# Patient Record
Sex: MALE | Race: ASIAN | NOT HISPANIC OR LATINO | Employment: FULL TIME | URBAN - METROPOLITAN AREA
[De-identification: names, ages, dates, MRNs, and addresses within clinical notes are randomized per-mention and may not be internally consistent; named-entity substitution may affect disease eponyms.]

---
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
## 2019-10-09 ENCOUNTER — OFFICE VISIT - HEALTHEAST (OUTPATIENT)
Dept: ADDICTION MEDICINE | Facility: CLINIC | Age: 19
End: 2019-10-09

## 2019-10-09 DIAGNOSIS — Z03.89 NO DIAGNOSIS ON AXIS I: ICD-10-CM

## 2019-10-18 ENCOUNTER — COMMUNICATION - HEALTHEAST (OUTPATIENT)
Dept: ADDICTION MEDICINE | Facility: CLINIC | Age: 19
End: 2019-10-18

## 2019-11-27 ENCOUNTER — OFFICE VISIT - HEALTHEAST (OUTPATIENT)
Dept: FAMILY MEDICINE | Facility: CLINIC | Age: 19
End: 2019-11-27

## 2019-11-27 DIAGNOSIS — Z00.00 ROUTINE GENERAL MEDICAL EXAMINATION AT A HEALTH CARE FACILITY: ICD-10-CM

## 2019-11-27 DIAGNOSIS — F17.210 CIGARETTE NICOTINE DEPENDENCE WITHOUT COMPLICATION: ICD-10-CM

## 2019-11-27 DIAGNOSIS — R00.2 PALPITATIONS: ICD-10-CM

## 2019-11-27 LAB
ANION GAP SERPL CALCULATED.3IONS-SCNC: 12 MMOL/L (ref 5–18)
BUN SERPL-MCNC: 12 MG/DL (ref 8–22)
CALCIUM SERPL-MCNC: 10.2 MG/DL (ref 8.5–10.5)
CHLORIDE BLD-SCNC: 105 MMOL/L (ref 98–107)
CO2 SERPL-SCNC: 23 MMOL/L (ref 22–31)
CREAT SERPL-MCNC: 0.92 MG/DL (ref 0.7–1.3)
ERYTHROCYTE [DISTWIDTH] IN BLOOD BY AUTOMATED COUNT: 13 % (ref 11–14.5)
GFR SERPL CREATININE-BSD FRML MDRD: >60 ML/MIN/1.73M2
GLUCOSE BLD-MCNC: 80 MG/DL (ref 70–125)
HBA1C MFR BLD: 5.9 % (ref 3.5–6)
HCT VFR BLD AUTO: 46.2 % (ref 40–54)
HGB BLD-MCNC: 15.4 G/DL (ref 14–18)
LDLC SERPL CALC-MCNC: 204 MG/DL
MCH RBC QN AUTO: 28.4 PG (ref 27–34)
MCHC RBC AUTO-ENTMCNC: 33.3 G/DL (ref 32–36)
MCV RBC AUTO: 85 FL (ref 80–100)
PLATELET # BLD AUTO: 422 THOU/UL (ref 140–440)
PMV BLD AUTO: 7.6 FL (ref 7–10)
POTASSIUM BLD-SCNC: 4 MMOL/L (ref 3.5–5)
RBC # BLD AUTO: 5.41 MILL/UL (ref 4.4–6.2)
SODIUM SERPL-SCNC: 140 MMOL/L (ref 136–145)
TSH SERPL DL<=0.005 MIU/L-ACNC: 2.93 UIU/ML (ref 0.3–5)
WBC: 13.1 THOU/UL (ref 4–11)

## 2019-11-27 ASSESSMENT — MIFFLIN-ST. JEOR: SCORE: 1688.81

## 2019-11-27 ASSESSMENT — PATIENT HEALTH QUESTIONNAIRE - PHQ9: SUM OF ALL RESPONSES TO PHQ QUESTIONS 1-9: 2

## 2019-12-02 ENCOUNTER — COMMUNICATION - HEALTHEAST (OUTPATIENT)
Dept: FAMILY MEDICINE | Facility: CLINIC | Age: 19
End: 2019-12-02

## 2021-04-22 ENCOUNTER — OFFICE VISIT - HEALTHEAST (OUTPATIENT)
Dept: FAMILY MEDICINE | Facility: CLINIC | Age: 21
End: 2021-04-22

## 2021-04-22 DIAGNOSIS — Z23 NEED FOR IMMUNIZATION AGAINST INFLUENZA: ICD-10-CM

## 2021-04-22 DIAGNOSIS — R06.02 SHORTNESS OF BREATH: ICD-10-CM

## 2021-04-22 DIAGNOSIS — R10.13 ABDOMINAL PAIN, EPIGASTRIC: ICD-10-CM

## 2021-04-22 DIAGNOSIS — Z00.00 ROUTINE GENERAL MEDICAL EXAMINATION AT A HEALTH CARE FACILITY: ICD-10-CM

## 2021-04-22 LAB
ALBUMIN SERPL-MCNC: 4.7 G/DL (ref 3.5–5)
ALP SERPL-CCNC: 75 U/L (ref 45–120)
ALT SERPL W P-5'-P-CCNC: 41 U/L (ref 0–45)
ANION GAP SERPL CALCULATED.3IONS-SCNC: 13 MMOL/L (ref 5–18)
AST SERPL W P-5'-P-CCNC: 33 U/L (ref 0–40)
BILIRUB SERPL-MCNC: 0.6 MG/DL (ref 0–1)
BUN SERPL-MCNC: 11 MG/DL (ref 8–22)
CALCIUM SERPL-MCNC: 10 MG/DL (ref 8.5–10.5)
CHLORIDE BLD-SCNC: 105 MMOL/L (ref 98–107)
CO2 SERPL-SCNC: 21 MMOL/L (ref 22–31)
CREAT SERPL-MCNC: 0.89 MG/DL (ref 0.7–1.3)
GFR SERPL CREATININE-BSD FRML MDRD: >60 ML/MIN/1.73M2
GLUCOSE BLD-MCNC: 114 MG/DL (ref 70–125)
POTASSIUM BLD-SCNC: 4.2 MMOL/L (ref 3.5–5)
PROT SERPL-MCNC: 7.9 G/DL (ref 6–8)
SODIUM SERPL-SCNC: 139 MMOL/L (ref 136–145)

## 2021-04-22 ASSESSMENT — MIFFLIN-ST. JEOR: SCORE: 1699.24

## 2021-04-26 ENCOUNTER — AMBULATORY - HEALTHEAST (OUTPATIENT)
Dept: LAB | Facility: CLINIC | Age: 21
End: 2021-04-26

## 2021-04-26 DIAGNOSIS — R10.13 ABDOMINAL PAIN, EPIGASTRIC: ICD-10-CM

## 2021-04-28 LAB — H PYLORI AG STL QL IA: NEGATIVE

## 2021-04-29 ENCOUNTER — COMMUNICATION - HEALTHEAST (OUTPATIENT)
Dept: FAMILY MEDICINE | Facility: CLINIC | Age: 21
End: 2021-04-29

## 2021-05-06 ENCOUNTER — AMBULATORY - HEALTHEAST (OUTPATIENT)
Dept: NURSING | Facility: CLINIC | Age: 21
End: 2021-05-06

## 2021-05-10 ENCOUNTER — OFFICE VISIT - HEALTHEAST (OUTPATIENT)
Dept: FAMILY MEDICINE | Facility: CLINIC | Age: 21
End: 2021-05-10

## 2021-05-10 ENCOUNTER — COMMUNICATION - HEALTHEAST (OUTPATIENT)
Dept: SCHEDULING | Facility: CLINIC | Age: 21
End: 2021-05-10

## 2021-05-10 DIAGNOSIS — R42 ORTHOSTATIC DIZZINESS: ICD-10-CM

## 2021-05-10 DIAGNOSIS — R10.13 ABDOMINAL PAIN, EPIGASTRIC: ICD-10-CM

## 2021-05-10 DIAGNOSIS — R00.2 PALPITATIONS: ICD-10-CM

## 2021-05-10 DIAGNOSIS — R06.02 SHORTNESS OF BREATH: ICD-10-CM

## 2021-05-10 ASSESSMENT — MIFFLIN-ST. JEOR: SCORE: 1673.28

## 2021-05-13 ENCOUNTER — OFFICE VISIT - HEALTHEAST (OUTPATIENT)
Dept: FAMILY MEDICINE | Facility: CLINIC | Age: 21
End: 2021-05-13

## 2021-05-13 DIAGNOSIS — K29.20 ACUTE ALCOHOLIC GASTRITIS WITHOUT HEMORRHAGE: ICD-10-CM

## 2021-05-13 DIAGNOSIS — K21.00 GASTROESOPHAGEAL REFLUX DISEASE WITH ESOPHAGITIS WITHOUT HEMORRHAGE: ICD-10-CM

## 2021-05-13 ASSESSMENT — MIFFLIN-ST. JEOR: SCORE: 1674.08

## 2021-05-19 ENCOUNTER — HOSPITAL ENCOUNTER (OUTPATIENT)
Dept: CARDIOLOGY | Facility: HOSPITAL | Age: 21
Discharge: HOME | End: 2021-05-19
Attending: FAMILY MEDICINE

## 2021-05-19 DIAGNOSIS — R00.2 PALPITATIONS: ICD-10-CM

## 2021-05-19 DIAGNOSIS — R42 ORTHOSTATIC DIZZINESS: ICD-10-CM

## 2021-05-22 ENCOUNTER — OFFICE VISIT - HEALTHEAST (OUTPATIENT)
Dept: FAMILY MEDICINE | Facility: CLINIC | Age: 21
End: 2021-05-22

## 2021-05-22 DIAGNOSIS — R06.02 SOB (SHORTNESS OF BREATH): ICD-10-CM

## 2021-05-22 RX ORDER — ALBUTEROL SULFATE 90 UG/1
2 AEROSOL, METERED RESPIRATORY (INHALATION) EVERY 6 HOURS PRN
Qty: 1 EACH | Refills: 0 | Status: SHIPPED | OUTPATIENT
Start: 2021-05-22

## 2021-05-24 ENCOUNTER — AMBULATORY - HEALTHEAST (OUTPATIENT)
Dept: FAMILY MEDICINE | Facility: CLINIC | Age: 21
End: 2021-05-24

## 2021-05-24 ENCOUNTER — COMMUNICATION - HEALTHEAST (OUTPATIENT)
Dept: SCHEDULING | Facility: CLINIC | Age: 21
End: 2021-05-24

## 2021-05-24 ENCOUNTER — OFFICE VISIT - HEALTHEAST (OUTPATIENT)
Dept: FAMILY MEDICINE | Facility: CLINIC | Age: 21
End: 2021-05-24

## 2021-05-24 DIAGNOSIS — R94.31 ABNORMAL HOLTER EXAM: ICD-10-CM

## 2021-05-24 DIAGNOSIS — R06.02 SHORTNESS OF BREATH: ICD-10-CM

## 2021-05-24 DIAGNOSIS — R42 ORTHOSTATIC DIZZINESS: ICD-10-CM

## 2021-05-24 DIAGNOSIS — R00.2 PALPITATIONS: ICD-10-CM

## 2021-05-24 DIAGNOSIS — K21.00 GASTROESOPHAGEAL REFLUX DISEASE WITH ESOPHAGITIS WITHOUT HEMORRHAGE: ICD-10-CM

## 2021-05-24 DIAGNOSIS — F41.9 ANXIETY: ICD-10-CM

## 2021-05-24 LAB — TSH SERPL DL<=0.005 MIU/L-ACNC: 1.78 UIU/ML (ref 0.3–5)

## 2021-05-24 RX ORDER — FAMOTIDINE 20 MG/1
20 TABLET, FILM COATED ORAL 2 TIMES DAILY
Status: SHIPPED | COMMUNITY
Start: 2021-05-24

## 2021-05-25 ENCOUNTER — COMMUNICATION - HEALTHEAST (OUTPATIENT)
Dept: SCHEDULING | Facility: CLINIC | Age: 21
End: 2021-05-25

## 2021-05-26 ENCOUNTER — AMBULATORY - HEALTHEAST (OUTPATIENT)
Dept: CARE COORDINATION | Facility: CLINIC | Age: 21
End: 2021-05-26

## 2021-05-26 ENCOUNTER — COMMUNICATION - HEALTHEAST (OUTPATIENT)
Dept: NURSING | Facility: CLINIC | Age: 21
End: 2021-05-26

## 2021-05-26 DIAGNOSIS — Z71.89 OTHER SPECIFIED COUNSELING: Chronic | ICD-10-CM

## 2021-05-26 ASSESSMENT — PATIENT HEALTH QUESTIONNAIRE - PHQ9: SUM OF ALL RESPONSES TO PHQ QUESTIONS 1-9: 2

## 2021-05-27 ENCOUNTER — OFFICE VISIT - HEALTHEAST (OUTPATIENT)
Dept: FAMILY MEDICINE | Facility: CLINIC | Age: 21
End: 2021-05-27

## 2021-05-27 VITALS
SYSTOLIC BLOOD PRESSURE: 124 MMHG | OXYGEN SATURATION: 97 % | HEART RATE: 74 BPM | WEIGHT: 165.2 LBS | DIASTOLIC BLOOD PRESSURE: 80 MMHG | RESPIRATION RATE: 20 BRPM | HEIGHT: 65 IN | BODY MASS INDEX: 27.52 KG/M2 | TEMPERATURE: 98.4 F

## 2021-05-27 VITALS
RESPIRATION RATE: 18 BRPM | WEIGHT: 165.38 LBS | HEIGHT: 65 IN | BODY MASS INDEX: 27.56 KG/M2 | DIASTOLIC BLOOD PRESSURE: 74 MMHG | TEMPERATURE: 98.2 F | SYSTOLIC BLOOD PRESSURE: 128 MMHG | HEART RATE: 80 BPM

## 2021-05-27 DIAGNOSIS — K29.20 ACUTE ALCOHOLIC GASTRITIS WITHOUT HEMORRHAGE: ICD-10-CM

## 2021-05-27 DIAGNOSIS — M94.0 COSTOCHONDRITIS: ICD-10-CM

## 2021-05-27 DIAGNOSIS — K21.00 GASTROESOPHAGEAL REFLUX DISEASE WITH ESOPHAGITIS WITHOUT HEMORRHAGE: ICD-10-CM

## 2021-05-27 DIAGNOSIS — R07.89 CHEST WALL PAIN: ICD-10-CM

## 2021-05-27 LAB
CHOLEST SERPL-MCNC: 301 MG/DL
FASTING STATUS PATIENT QL REPORTED: NO
HDLC SERPL-MCNC: 43 MG/DL
LDLC SERPL CALC-MCNC: 216 MG/DL
TRIGL SERPL-MCNC: 211 MG/DL

## 2021-05-27 RX ORDER — SUCRALFATE 1 G/1
1 TABLET ORAL 4 TIMES DAILY
Qty: 120 TABLET | Refills: 1 | Status: SHIPPED | OUTPATIENT
Start: 2021-05-27

## 2021-05-27 ASSESSMENT — MIFFLIN-ST. JEOR: SCORE: 1646.58

## 2021-05-28 ENCOUNTER — COMMUNICATION - HEALTHEAST (OUTPATIENT)
Dept: FAMILY MEDICINE | Facility: CLINIC | Age: 21
End: 2021-05-28

## 2021-05-28 ENCOUNTER — AMBULATORY - HEALTHEAST (OUTPATIENT)
Dept: FAMILY MEDICINE | Facility: CLINIC | Age: 21
End: 2021-05-28

## 2021-06-02 VITALS — BODY MASS INDEX: 24.99 KG/M2 | HEIGHT: 65 IN | WEIGHT: 150 LBS

## 2021-06-03 VITALS
SYSTOLIC BLOOD PRESSURE: 128 MMHG | HEART RATE: 94 BPM | DIASTOLIC BLOOD PRESSURE: 70 MMHG | RESPIRATION RATE: 16 BRPM | WEIGHT: 166 LBS | TEMPERATURE: 98.9 F | BODY MASS INDEX: 27.66 KG/M2 | HEIGHT: 65 IN | OXYGEN SATURATION: 97 %

## 2021-06-03 NOTE — TELEPHONE ENCOUNTER
----- Message from Stiven Pelaez MD sent at 12/2/2019 11:38 AM CST -----  Please inform the patient that his test results are all normal except for a significant elevation in his cholesterol.  He should change his eating to incorporate more fruits and vegetables and whole grains in his daily diet, daily exercise, less meats and eggs and other fatty foods, and recheck the cholesterol again in 6 to 9 months.

## 2021-06-03 NOTE — PROGRESS NOTES
Physical Exam       ASSESMENT AND PLAN:    Physical, Health Maitenence -   Reviewed healthy lifestyle, diet, exercise, vitamins, and follow-up plan today with patient.  Reviewed age appropriate cancer and other screening recommendations.  Immunization review and update done.  Reviewed indicated lab tests, see lab orders.     -     Hearing Screening  -     Vision Screening  -     LDL Cholesterol, Direct  -     Influenza,Seasonal,Quad,INJ =/>6months    Palpitations  Reviewed differential diagnosis with the patient, discussed indications for follow-up.  Will call patient with lab results when they are available.  -     HM2(CBC w/o Differential)  -     Thyroid Cascade  -     Basic Metabolic Panel  -     Glycosylated Hemoglobin A1c    Cigarette nicotine dependence without complication  Counseling done on a quit plan.  Will use nicotine patch as prescribed below.  -     nicotine (NICODERM CQ) 14 mg/24 hr; Place 1 patch on the skin daily.  Dispense: 30 patch; Refill: 6          HPI: 18-year-old male here for his physical.  He would like to quit smoking.  Currently smoking about 10 cigarettes/day.  Patient has a remote history of meth abuse but has not used any meth in the past couple of years.  Patient reports that he gets some occasional palpitations.  Symptoms last for several minutes, associated with some feelings of anxiety, it has been going on on and off occasionally over the past 3 months.  No chest pain, no shortness of breath.  No near syncope.    ROS: No blood in the urine or blood in the stool, no chest pain, no shortness of breath, no fever, remainder of review of systems is as above or negative.    No past medical history on file.    Current Outpatient Medications   Medication Sig Dispense Refill     nicotine (NICODERM CQ) 14 mg/24 hr Place 1 patch on the skin daily. 30 patch 6     No current facility-administered medications for this visit.        Patient Active Problem List   Diagnosis     Decrease In  "Appetite     Contusion Of Lung     Open Wound Of The Scalp     Acute Sore Throat     Acute Lymphadenitis     Cigarette nicotine dependence without complication       Social History     Socioeconomic History     Marital status: Single     Spouse name: None     Number of children: None     Years of education: None     Highest education level: None   Occupational History     None   Social Needs     Financial resource strain: None     Food insecurity:     Worry: None     Inability: None     Transportation needs:     Medical: None     Non-medical: None   Tobacco Use     Smoking status: Current Some Day Smoker     Types: Cigarettes     Smokeless tobacco: Never Used   Substance and Sexual Activity     Alcohol use: No     Drug use: None     Sexual activity: None   Lifestyle     Physical activity:     Days per week: None     Minutes per session: None     Stress: None   Relationships     Social connections:     Talks on phone: None     Gets together: None     Attends Yazdanism service: None     Active member of club or organization: None     Attends meetings of clubs or organizations: None     Relationship status: None     Intimate partner violence:     Fear of current or ex partner: None     Emotionally abused: None     Physically abused: None     Forced sexual activity: None   Other Topics Concern     None   Social History Narrative     None       Social History     Tobacco Use   Smoking Status Current Some Day Smoker     Types: Cigarettes   Smokeless Tobacco Never Used       OBJECTICE: /70 (Patient Site: Right Arm)   Pulse 94   Temp 98.9  F (37.2  C) (Oral)   Resp 16   Ht 5' 5.25\" (1.657 m)   Wt 166 lb (75.3 kg)   SpO2 97%   BMI 27.41 kg/m        Gen - alert, orientated, NAD  Eyes - fundascopic exam limited by the undialated pupil but looks symmetric  ENT - oropharynx clear, TMs clear  Neck - supple, no palpable mass or lymphadenopathy  CV - RRR, no murmur  Resp - lungs CTA  Ab - soft, nontender, no palpable " mass or organomegaly   - normal appearance to the external genetalia, normal testicular exam bilaterally, no hernia  Extrem - warm, no edema  Neuro - CN II-XII intact, strength, sensation, reflexes intact and symmetric  Skin - no rash, no atypical appearing lesions seen.         Stiven Pelaez   8:40 PM 11/27/2019

## 2021-06-05 VITALS
TEMPERATURE: 98.1 F | DIASTOLIC BLOOD PRESSURE: 88 MMHG | WEIGHT: 170 LBS | RESPIRATION RATE: 18 BRPM | BODY MASS INDEX: 28.32 KG/M2 | HEART RATE: 94 BPM | SYSTOLIC BLOOD PRESSURE: 126 MMHG | HEIGHT: 65 IN

## 2021-06-10 ENCOUNTER — OFFICE VISIT - HEALTHEAST (OUTPATIENT)
Dept: FAMILY MEDICINE | Facility: CLINIC | Age: 21
End: 2021-06-10

## 2021-06-10 DIAGNOSIS — K21.00 GASTROESOPHAGEAL REFLUX DISEASE WITH ESOPHAGITIS WITHOUT HEMORRHAGE: ICD-10-CM

## 2021-06-10 DIAGNOSIS — K29.20 ACUTE ALCOHOLIC GASTRITIS WITHOUT HEMORRHAGE: ICD-10-CM

## 2021-06-10 DIAGNOSIS — R14.0 ABDOMINAL BLOATING: ICD-10-CM

## 2021-06-10 DIAGNOSIS — K29.50 OTHER CHRONIC GASTRITIS WITHOUT HEMORRHAGE: ICD-10-CM

## 2021-06-10 RX ORDER — SIMETHICONE 80 MG
80 TABLET,CHEWABLE ORAL 4 TIMES DAILY PRN
Qty: 100 TABLET | Refills: 2 | Status: SHIPPED | OUTPATIENT
Start: 2021-06-10 | End: 2021-08-05

## 2021-06-10 ASSESSMENT — MIFFLIN-ST. JEOR: SCORE: 1652.25

## 2021-06-16 PROBLEM — F17.210 CIGARETTE NICOTINE DEPENDENCE WITHOUT COMPLICATION: Status: ACTIVE | Noted: 2019-11-27

## 2021-06-16 NOTE — PROGRESS NOTES
MALE PREVENTATIVE EXAM    Assessment and Plan:     Patient has been advised of split billing requirements and indicates understanding: Yes    1. Routine general medical examination at a health care facility  Anticipatory guidance discussed he is not fasting today.  He has quit smoking and is only smoking 1 cigarette/day he agrees to have the influenza vaccine he is not sure if he is going to sign up to receive a coronavirus vaccine  Patient denies any street drug use however he did try marijuana yesterday  2. Abdominal pain, epigastric  Intermittent abdominal pain noted mainly with spicy food which causes him to have diarrhea.  He does notice bloating and a burning sensation in his epigastric area which radiates into his neck at night  - H. pylori Antigen, Stool(HPSAG); Future  - Comprehensive Metabolic Panel    3. Shortness of breath  Shortness of breath noted only at night when he is lying down not associated with any other symptoms we will check LFTs and a BMP.  No family history or personal history of asthma or allergies  - Comprehensive Metabolic Panel    4. Need for immunization against influenza    - Influenza, Seasonal Quad, PF =/> 6months     Next follow up:  No follow-ups on file.    Immunization Review  Adult Imm Review: Due today, orders placed      I discussed the following with the patient:   Adult Healthy Living: Importance of regular exercise  Healthy nutrition  Getting adequate sleep        Subjective:   Chief Complaint: Robert Redmond is an 20 y.o. male here for a preventative health visit.    Patient has been advised of split billing requirements and indicates understanding: Yes  HPI:    20-year-old male here for annual physical and has other questions regarding his health.  He has noticed shortness of breath when he lies down at night this is been occurring over the last 2 weeks.  He reduce the amount of cigarettes he smokes from a pack a day to 1 cigarette but that has not made a difference he notes  "no wheezing or cough during the day.  He also notes abdominal burning and pain when he eats spicy food sometimes culminating in diarrhea.  He states that he has not noticed any weight loss or changes.  He has a medical history for chronic nicotine abuse and had been using methamphetamines approximately 5 years ago he denies using any street drugs lately however he did use marijuana yesterday.    Healthy Habits  Are you taking a daily aspirin? No  Do you typically exercising at least 40 min, 3-4 times per week?  NO  Do you usually eat at least 4 servings of fruit and vegetables a day, include whole grains and fiber and avoid regularly eating high fat foods? NO  Have you had an eye exam in the past two years? No  Do you see a dentist twice per year? NO  Do you have any concerns regarding sleep? YES    Safety Screen  If you own firearms, are they secured in a locked gun cabinet or with trigger locks? YES  Do you feel you are safe where you are living?: Yes (4/22/2021  3:09 PM)      Review of Systems:  Please see above.  The rest of the review of systems are negative for all systems.     Cancer Screening     Patient has no health maintenance due at this time          Patient Care Team:  Provider, No Primary Care as PCP - General  Stiven Pelaez MD Letts, James P, MD as Assigned PCP        History     Reviewed By Date/Time Sections Reviewed    Ambika Garcia MA 4/22/2021  3:13 PM Tobacco    Ambika Garcia MA 4/22/2021  3:09 PM Tobacco            Objective:   Vital Signs:   Visit Vitals  /90   Pulse 94   Temp 98.1  F (36.7  C) (Oral)   Resp 18   Ht 5' 4.76\" (1.645 m)   Wt 170 lb (77.1 kg)   BMI 28.50 kg/m           PHYSICAL EXAM  /88   Pulse 94   Temp 98.1  F (36.7  C) (Oral)   Resp 18   Ht 5' 4.76\" (1.645 m)   Wt 170 lb (77.1 kg)   BMI 28.50 kg/m      General Appearance:    Alert, cooperative, no distress, appears stated age   Head:    Normocephalic, without obvious abnormality, atraumatic   Eyes: "    PERRL, conjunctiva/corneas clear, EOM's intact, fundi     benign, both eyes        Ears:    Normal TM's and external ear canals, both ears   Nose:   Nares normal, septum midline, mucosa normal, no drainage    or sinus tenderness   Throat:   Lips, mucosa, and tongue normal; teeth and gums normal   Neck:   Supple, symmetrical, trachea midline, no adenopathy;        thyroid:  No enlargement/tenderness/nodules; no carotid    bruit or JVD   Back:     Symmetric, no curvature, ROM normal, no CVA tenderness   Lungs:     Clear to auscultation bilaterally, respirations unlabored   Chest wall:    No tenderness or deformity   Heart:    Regular rate and rhythm, S1 and S2 normal, no murmur, rub    or gallop   Abdomen:     Soft, non-tender, bowel sounds active all four quadrants,     no masses, no organomegaly   Genitalia:    Normal male without lesion, discharge or tenderness   Rectal:       Extremities:   Extremities normal, atraumatic, no cyanosis or edema   Pulses:   2+ and symmetric all extremities   Skin:   Skin color, texture, turgor normal, no rashes or lesions   Lymph nodes:   Cervical, supraclavicular, and axillary nodes normal   Neurologic:   CNII-XII intact. Normal strength, sensation and reflexes       throughout                Medication List      as of April 22, 2021  3:51 PM     You have not been prescribed any medications.         Additional Screenings Completed Today:

## 2021-06-17 ENCOUNTER — HOSPITAL ENCOUNTER (OUTPATIENT)
Dept: CARDIOLOGY | Facility: HOSPITAL | Age: 21
Discharge: HOME | End: 2021-06-17
Attending: FAMILY MEDICINE
Payer: COMMERCIAL

## 2021-06-17 DIAGNOSIS — R00.2 PALPITATIONS: ICD-10-CM

## 2021-06-17 DIAGNOSIS — R94.31 ABNORMAL HOLTER EXAM: ICD-10-CM

## 2021-06-17 DIAGNOSIS — R42 ORTHOSTATIC DIZZINESS: ICD-10-CM

## 2021-06-17 DIAGNOSIS — R06.02 SHORTNESS OF BREATH: ICD-10-CM

## 2021-06-17 LAB
AORTIC ROOT: 3.1 CM
AORTIC VALVE MEAN VELOCITY: 88.9 CM/S
ASCENDING AORTA: 3 CM
AV DIMENSIONLESS INDEX VTI: 0.9
AV MEAN GRADIENT: 4 MMHG
AV PEAK GRADIENT: 6.9 MMHG
AV VALVE AREA: 2.6 CM2
BSA FOR ECHO PROCEDURE: 1.82 M2
CV BLOOD PRESSURE: ABNORMAL MMHG
CV ECHO HEIGHT: 64.5 IN
CV ECHO WEIGHT: 160 LBS
DOP CALC AO PEAK VEL: 131 CM/S
DOP CALC AO VTI: 26.5 CM
DOP CALC LVOT AREA: 2.83 CM2
DOP CALC LVOT DIAMETER: 1.9 CM
DOP CALC LVOT STROKE VOLUME: 69.1 CM3
DOP CALCLVOT PEAK VEL VTI: 24.4 CM
EJECTION FRACTION: 65 % (ref 55–75)
FRACTIONAL SHORTENING: 38.3 % (ref 28–44)
INTERVENTRICULAR SEPTUM IN END DIASTOLE: 1.1 CM (ref 0.6–1)
IVS/PW RATIO: 1.1
LA AREA 1: 18.6 CM2
LA AREA 2: 11.7 CM2
LEFT ATRIUM LENGTH: 4.65 CM
LEFT ATRIUM SIZE: 3.4 CM
LEFT ATRIUM TO AORTIC ROOT RATIO: 1.1 NO UNITS
LEFT ATRIUM VOLUME INDEX: 21.9 ML/M2
LEFT ATRIUM VOLUME: 39.8 ML
LEFT VENTRICLE CARDIAC INDEX: 2.8 L/MIN/M2
LEFT VENTRICLE CARDIAC OUTPUT: 5.1 L/MIN
LEFT VENTRICLE DIASTOLIC VOLUME INDEX: 39.6 CM3/M2 (ref 34–74)
LEFT VENTRICLE DIASTOLIC VOLUME: 72 CM3 (ref 62–150)
LEFT VENTRICLE HEART RATE: 74 BPM
LEFT VENTRICLE MASS INDEX: 96.6 G/M2
LEFT VENTRICLE SYSTOLIC VOLUME INDEX: 13.7 CM3/M2 (ref 11–31)
LEFT VENTRICLE SYSTOLIC VOLUME: 25 CM3 (ref 21–61)
LEFT VENTRICULAR INTERNAL DIMENSION IN DIASTOLE: 4.7 CM (ref 4.2–5.8)
LEFT VENTRICULAR INTERNAL DIMENSION IN SYSTOLE: 2.9 CM (ref 2.5–4)
LEFT VENTRICULAR MASS: 175.8 G
LEFT VENTRICULAR OUTFLOW TRACT MEAN GRADIENT: 3 MMHG
LEFT VENTRICULAR OUTFLOW TRACT MEAN VELOCITY: 86.8 CM/S
LEFT VENTRICULAR POSTERIOR WALL IN END DIASTOLE: 1 CM (ref 0.6–1)
LV STROKE VOLUME INDEX: 38 ML/M2
MITRAL VALVE E/A RATIO: 1.7
MV AVERAGE E/E' RATIO: 7.9 CM/S
MV DECELERATION TIME: 162 MS
MV E'TISSUE VEL-LAT: 15.3 CM/S
MV E'TISSUE VEL-MED: 11 CM/S
MV LATERAL E/E' RATIO: 6.8
MV MEDIAL E/E' RATIO: 9.5
MV PEAK A VELOCITY: 59.7 CM/S
MV PEAK E VELOCITY: 104 CM/S
NUC REST DIASTOLIC VOLUME INDEX: 2560 LBS
NUC REST SYSTOLIC VOLUME INDEX: 64.5 IN
PR MAX PG: 4 MMHG
PR PEAK VELOCITY: 105 CM/S
PV ACCELERATION TIME: 148 MS
TRICUSPID REGURGITATION PEAK PRESSURE GRADIENT: 24.2 MMHG
TRICUSPID VALVE ANULAR PLANE SYSTOLIC EXCURSION: 2.2 CM
TRICUSPID VALVE PEAK REGURGITANT VELOCITY: 246 CM/S

## 2021-06-17 ASSESSMENT — MIFFLIN-ST. JEOR: SCORE: 1649.7

## 2021-06-17 NOTE — PROGRESS NOTES
Subjective:      Patient ID: Dk Reyna is a 20 y.o. male.    Chief Complaint:    Dk comes in for ongoing chest discomfort and tightness that is not new. Upon review of his chart he has undergone comprehensive gastroenterology and cardiac workup. Vital signs are stable today. He is wondering about prednisone for possible asthma although has not been diagnosed with this. Subjectively short of breath although no chest pain and no visible distress on exam. Recently had cardiac monitor and was informed he had normal results. Hx of smoking but as not smoked for several months. Normal CXR 5/8/21. No URI symptoms today.          No past medical history on file.    No past surgical history on file.    No family history on file.    Social History     Tobacco Use     Smoking status: Former Smoker     Types: Cigarettes     Smokeless tobacco: Never Used   Substance Use Topics     Alcohol use: No     Drug use: Not on file       Review of Systems   Constitutional: Negative.    HENT: Negative.    Eyes: Negative.    Respiratory: Positive for chest tightness.    Cardiovascular: Negative.    Gastrointestinal: Negative.    Endocrine: Negative.    Genitourinary: Negative.    Musculoskeletal: Negative.    Allergic/Immunologic: Negative.    Neurological: Negative.        Objective:     /81 (Patient Site: Right Arm, Patient Position: Sitting, Cuff Size: Adult Regular)   Pulse 80   Temp 99  F (37.2  C) (Oral)   Resp 18   SpO2 98%     Physical Exam  Constitutional:       Appearance: Normal appearance.   HENT:      Head: Normocephalic and atraumatic.      Right Ear: Tympanic membrane normal.      Left Ear: Tympanic membrane normal.      Nose: Nose normal.      Mouth/Throat:      Mouth: Mucous membranes are moist.   Eyes:      Pupils: Pupils are equal, round, and reactive to light.   Cardiovascular:      Rate and Rhythm: Normal rate and regular rhythm.      Heart sounds: Normal heart sounds. No murmur.   Pulmonary:       Effort: Pulmonary effort is normal.      Breath sounds: Normal breath sounds. No wheezing or rhonchi.   Abdominal:      General: Abdomen is flat.   Neurological:      Mental Status: He is alert.         Assessment:     Procedures  Patient comes in with chest tightness and shortness of breath with normal vital signs and normal recent cardiac workup. May benefit from PFTs in the future. At this point he is not wheezing and thus I do not feel oral prednisone would be helpful.   The encounter diagnosis was SOB (shortness of breath).    Plan:     1. SOB (shortness of breath)  albuterol (PROAIR HFA;PROVENTIL HFA;VENTOLIN HFA) 90 mcg/actuation inhaler     Given albuterol inhaler to use when he is short of breath. I defer to PCP as I don't feel this represents any acute infection, ACS, or other process and I don't feel further urgent care work up is warranted. Consider PFTs and possibly anxiety as outpatient. He will schedule with PCP in 1- 2 weeks.

## 2021-06-17 NOTE — PROGRESS NOTES
Assessment/Plan:     1. Palpitations  Nothing was captured during ER visit or on resting EKG.  Given persistence of symptoms, occurrence on nearly daily basis, and potential that is associated with his dyspnea, will assess further with a Holter monitor.  Further follow-up and recommendations pending results.  - Holter Monitor; Future    2. Orthostatic dizziness  Etiology not apparent.  Occult arrhythmia could be contributing.  It does not seem to be strict otherwise cardiac in nature with negative troponins, normal hemogram, normal comprehensive metabolic panel, normal chest x-ray.  Advised adequate hydration, further follow-up pending Holter monitor results.  - Holter Monitor; Future    3. Shortness of breath  This may be related to esophageal reflux, gastritis, or occult arrhythmias noted.  Plan as above.    4. Abdominal pain, epigastric  Continue omeprazole.  Continue bland diet, avoidance of alcohol, advised tobacco cessation.    There seems to be a significant anxiety component to his symptoms, though it is not clear how much may be related to anxiety and how much may be primary.  I reassured him that there are no signs of heart attack or stroke, both of which she is very concerned about, and reassured that blood sugars have been in the normal range without evidence of diabetes.      Patient Instructions   The next step in evaluating your dizziness and heart racing is to do a heart monitor.  The cardiology clinic will call you to schedule this.    Continue taking omeprazole once daily for 2 weeks.  Continue bland foods.  Continue to drink 60 ounces of water or more each day.    I suspect your shortness of breath is related to heartburn or reflux.  This should get better with omeprazole.    There are no signs of heart attack, stroke, or diabetes on your recent evaluations.       Return in about 4 weeks (around 6/7/2021) for Follow up with your primary care provider.      Subjective:      Dk Reyna is a 20  y.o. male presenting to clinic today for follow-up of ongoing concerns.  Initially seen at primary care provider's office with epigastric pain, dizziness.  Started on omeprazole, H. pylori was negative, normal kidney and liver function with electrolytes.  Started on omeprazole.  With persisting symptoms he was then seen in emergency room as he had developed increasing lightheadedness usually with standing, sense of generalized weakness, sense of shortness of breath when laying flat though it resolves when rolling to his side, and noting increasing difficulty eating as it would cause nausea.  He had discontinued spicy foods and meats.  He discontinued alcohol intake.  Notes that he has been intermittently having a very fast heart rate that will last up to 30 minutes or so, believes yesterday that famotidine helped.  Has had a few times where he is felt very full after eating and has needed to vomit.  Notes that his stools are somewhat yellow and loose, smaller amount than previous.  Describes intermittent left upper quadrant pain or occasional right upper quadrant pain in addition epigastric discomfort.  Notes that his left posterior head sometimes feels hot and tired, eyes will sometimes feel tired especially when he is more dizzy.    He presents to clinic today along with his wife, Queta.    Notes from emergency room visit are reviewed, including labs, normal chest x-ray, unremarkable EKG.    Current Outpatient Medications   Medication Sig Dispense Refill     famotidine (PEPCID) 20 MG tablet Take 1 tablet (20 mg total) by mouth 2 (two) times a day for 14 days. 30 tablet 0     omeprazole (PRILOSEC) 20 MG capsule Take 1 capsule (20 mg total) by mouth daily before breakfast. 30 capsule 0     No current facility-administered medications for this visit.        Past Medical History, Family History, and Social History reviewed.  No past medical history on file.  No past surgical history on file.  Patient has no known  "allergies.  No family history on file.  Social History     Socioeconomic History     Marital status: Single     Spouse name: Not on file     Number of children: Not on file     Years of education: Not on file     Highest education level: Not on file   Occupational History     Not on file   Social Needs     Financial resource strain: Not on file     Food insecurity     Worry: Not on file     Inability: Not on file     Transportation needs     Medical: Not on file     Non-medical: Not on file   Tobacco Use     Smoking status: Current Some Day Smoker     Types: Cigarettes     Smokeless tobacco: Never Used   Substance and Sexual Activity     Alcohol use: No     Drug use: Not on file     Sexual activity: Not on file   Lifestyle     Physical activity     Days per week: Not on file     Minutes per session: Not on file     Stress: Not on file   Relationships     Social connections     Talks on phone: Not on file     Gets together: Not on file     Attends Mandaeism service: Not on file     Active member of club or organization: Not on file     Attends meetings of clubs or organizations: Not on file     Relationship status: Not on file     Intimate partner violence     Fear of current or ex partner: Not on file     Emotionally abused: Not on file     Physically abused: Not on file     Forced sexual activity: Not on file   Other Topics Concern     Not on file   Social History Narrative     Not on file         Review of systems is as stated in HPI, and the remainder of the 10 system review is otherwise negative.    Objective:     Vitals:    05/10/21 1417   BP: 124/80   Pulse: 74   Resp: 20   Temp: 98.4  F (36.9  C)   TempSrc: Oral   SpO2: 97%   Weight: 165 lb 3.2 oz (74.9 kg)   Height: 5' 4.5\" (1.638 m)    Body mass index is 27.92 kg/m .    Alert male.  Anxious.  Pupils are equal, round, reactive to light.  Tympanic membranes pearly and translucent.  Oropharynx clear.  Neck supple without of adenopathy or thyromegaly.  Heart " with regular rate and rhythm, no aberrancy.  No murmurs.  Lungs are clear and well aerated.  Abdomen is soft, nontender, nondistended, no organomegaly or masses.  Extremities without edema.      This note has been dictated using voice recognition software. Any grammatical or context distortions are unintentional and inherent to the the software.

## 2021-06-17 NOTE — TELEPHONE ENCOUNTER
"Patient calling reporting he was seen in the ER on 5/8/21 for chest pain, high blood pressure. Patient stating he continues to feel \"dizzy.\" Stating he is able to ambulate on his own.   Blood pressure 138/91 pulse 67 checked during triage.    Disposition to see in clinic today.    Transferred to Central Scheduling.      Cherie Canales RN  [facility]    COVID 19 Nurse Triage Plan/Patient Instructions    Please be aware that novel coronavirus (COVID-19) may be circulating in the community. If you develop symptoms such as fever, cough, or SOB or if you have concerns about the presence of another infection including coronavirus (COVID-19), please contact your health care provider or visit  [email or web address].    Disposition/Instructions    In-Person Visit with provider recommended. Reference Visit Selection Guide.    Thank you for taking steps to prevent the spread of this virus.  o Limit your contact with others.  o Wear a simple mask to cover your cough.  o Wash your hands well and often.    Resources    [facility]: About COVID-19: [email or web address]    CDC: What to Do If You're Sick: www.cdc.gov/coronavirus/2019-ncov/about/steps-when-sick.html    CDC: Ending Home Isolation: www.cdc.gov/coronavirus/2019-ncov/hcp/disposition-in-home-patients.html     CDC: Caring for Someone: www.cdc.gov/coronavirus/2019-ncov/if-you-are-sick/care-for-someone.html     [facility]: Interim Guidance for Hospital Discharge to Home: www.health.[facility].[place]/diseases/coronavirus/hcp/hospdischarge.pdf    [facility] clinical trials (COVID-19 research studies): [email or web address]     Below are the COVID-19 hotlines at the [place] Department of Health ([facility]). Interpreters are available.   o For health questions: Call 444-229-1702 or 1-308.782.9390 (7 a.m. to 7 p.m.)  o For questions about schools and childcare: Call 039-263-7558 or 1-943.531.1119 (7 a.m. to 7 p.m.) "           Reason for Disposition    Patient wants to be seen    Additional Information    Negative: Shock suspected (e.g., cold/pale/clammy skin, too weak to stand, low BP, rapid pulse)    Negative: Difficult to awaken or acting confused (e.g., disoriented, slurred speech)    Negative: Fainted, and still feels dizzy afterwards    Negative: Severe difficulty breathing (e.g., struggling for each breath, speaks in single words)    Negative: Overdose (accidental or intentional) of medications    Negative: New neurologic deficit that is present now: * Weakness of the face, arm, or leg on one side of the body * Numbness of the face, arm, or leg on one side of the body * Loss of speech or garbled speech    Negative: Heart beating < 50 beats per minute OR > 140 beats per minute    Negative: Sounds like a life-threatening emergency to the triager    Negative: SEVERE dizziness (e.g., unable to stand, requires support to walk, feels like passing out now)    Negative: SEVERE headache or neck pain    Negative: Spinning or tilting sensation (vertigo) present now and one or more stroke risk factors (i.e., hypertension, diabetes, prior stroke/TIA, heart attack, age over 60) (Exception: prior physician evaluation for this AND no different/worse than usual)    Negative: Loss of vision or double vision    Negative: Extra heart beats OR irregular heart beating (i.e., 'palpitations')    Negative: Difficulty breathing    Negative: Drinking very little and has signs of dehydration (e.g., no urine > 12 hours, very dry mouth, very lightheaded)    Negative: Follows bleeding (e.g., stomach, rectum, vagina) (Exception: became dizzy from sight of small amount blood)    Negative: Patient sounds very sick or weak to the triager    Negative: Lightheadedness (dizziness) present now, after 2 hours of rest and fluids    Negative: Spinning or tilting sensation (vertigo) present now    Negative: Fever > 103 F (39.4 C)    Negative: Fever > 100.0 F (37.8  C) and has diabetes mellitus or a weak immune system (e.g., HIV positive, cancer chemotherapy, organ transplant, splenectomy, chronic steroids)    Negative: Vomiting occurs with dizziness    Protocols used: DIZZINESS-A-[place]

## 2021-06-17 NOTE — TELEPHONE ENCOUNTER
Pt return call. I relay message below. The patient verbalizes understanding of provider/CSS instructions for follow-up and continued care per provider message. Pt has no further questions.

## 2021-06-17 NOTE — PROGRESS NOTES
"ASSESSMENT and plan   1. Gastroesophageal reflux disease with esophagitis without hemorrhage  Taking omeprazole still having occasional abdominal pain.  Carafate added to his regimen he denies eating spicy food he is not drinking any alcohol he does not smoke.  - sucralfate (CARAFATE) 1 gram tablet; Take 1 tablet (1 g total) by mouth 4 (four) times a day.  Dispense: 120 tablet; Refill: 1    2. Acute alcoholic gastritis without hemorrhage    Patient denies any alcohol use his wife corroborates this information      There are no Patient Instructions on file for this visit.    No orders of the defined types were placed in this encounter.    There are no discontinued medications.    No follow-ups on file.    CHIEF COMPLAINT:  Chief Complaint   Patient presents with     Hospital Visit Follow Up       HISTORY OF PRESENT ILLNESS:  Dk is a 20 y.o. male who is following up today for epigastric discomfort he was actually seen in the hospital and treated for what was diagnosed as acute gastritis without hemorrhage.  He was experiencing chest pain and had extensive work-up.  He is then felt nauseated 2 days later he went to another clinic to further evaluation of headache and chest discomfort.  They have set him up with potential cardiac testing for dizziness    REVIEW OF SYSTEMS:   Neuro no dizziness noted now no headache  CVS no chest pain  GI positive for abdominal discomfort  All other systems are negative.    PFSH:      TOBACCO USE:  Social History     Tobacco Use   Smoking Status Former Smoker     Types: Cigarettes   Smokeless Tobacco Never Used       VITALS:  Vitals:    05/13/21 1508   BP: 128/74   Pulse: 80   Resp: 18   Temp: 98.2  F (36.8  C)   TempSrc: Oral   Weight: 165 lb 6 oz (75 kg)   Height: 5' 4.5\" (1.638 m)     Wt Readings from Last 3 Encounters:   05/13/21 165 lb 6 oz (75 kg)   05/10/21 165 lb 3.2 oz (74.9 kg)   05/08/21 170 lb (77.1 kg)       PHYSICAL EXAM:  Interactive male sitting comfortably in the " exam room in no acute distress  HEENT neck supple mucous members moist oral cavity shows no exudate no erythema  CVS regular rate and rhythm no murmurs rubs gallops appreciated  Respiratory system clear to auscultation good breath sounds no wheeze no crackles  Abdomen soft there is no focal tenderness bowel sounds are present no organomegaly    DATA REVIEWED:  Additional History from Old Records Summarized (2): 0  Decision to Obtain Records (1): 0  Radiology Tests Summarized or Ordered (1): 0  Labs Reviewed or Ordered (1): 0  Medicine Test Summarized or Ordered (1): 0  Independent Review of EKG or X-RAY(2 each): 0    The visit lasted a total of 30 minutes.    MEDICATIONS:  Current Outpatient Medications   Medication Sig Dispense Refill     famotidine (PEPCID) 20 MG tablet Take 1 tablet (20 mg total) by mouth 2 (two) times a day for 14 days. 30 tablet 0     omeprazole (PRILOSEC) 20 MG capsule Take 1 capsule (20 mg total) by mouth daily before breakfast. 30 capsule 0     sucralfate (CARAFATE) 1 gram tablet Take 1 tablet (1 g total) by mouth 4 (four) times a day. 120 tablet 1     No current facility-administered medications for this visit.

## 2021-06-17 NOTE — TELEPHONE ENCOUNTER
RN Triage:    Spoke with 20 yr old Dk who c/o:    Was seen at [facility] 2 days ago for chest discomfort and tightness which is not new for patient.     Pt was prescribed an inhaler for shortness of breath but pt states this doesn't seem to be helpful.    Still reports shortness of breath today, even at rest.  Pt states he has had shortness of breath for 6 weeks; (thought to be related to esophageal reflux, gastritis or occult arrhythmias per OV note of 5/10/21.)    Pt able to speak in complete sentences during triage.    Pt states he has completed taking famotidine, omeprazole and sucralfate.    Pt states the omeprazole causes stomach bloating.  Sucralfate causes gas/burping.    Pt denies current chest pain or pressure.    PLAN:  Advised OV now per protocol.  Transferred to [facility] and scheduled for 1:00 pm at the [facility].  Haydee Hahn RN   [facility] RN Triage      Reason for Disposition    Patient wants to be seen     Pt with 6 week hx of shortness of breath and [facility] appointment 2 days ago.    Additional Information    Negative: Breathing stopped and hasn't returned    Negative: Choking on something    Negative: SEVERE difficulty breathing (e.g., struggling for each breath, speaks in single words, pulse > 120)    Negative: Bluish (or gray) lips or face    Negative: Difficult to awaken or acting confused (e.g., disoriented, slurred speech)    Negative: Passed out (i.e., fainted, collapsed and was not responding)    Negative: Wheezing started suddenly after medicine, an allergic food, or bee sting    Negative: Stridor    Negative: Slow, shallow and weak breathing    Negative: Sounds like a life-threatening emergency to the triager    Negative: Chest pain    Negative: Wheezing (high pitched whistling sound) and previous asthma attacks or use of asthma medicines    Negative: Difficulty breathing and only present when coughing    Negative: Difficulty breathing and only from stuffy or runny nose     "Negative: Difficulty breathing and within 14 days of COVID-19 Exposure    Negative: MODERATE difficulty breathing (e.g., speaks in phrases, SOB even at rest, pulse 100-120) of new onset or worse than normal    Negative: Wheezing can be heard across the room    Negative: Drooling or spitting out saliva (because can't swallow)    Negative: Any history of prior \"blood clot\" in leg or lungs    Negative: Illness requiring prolonged bedrest in past month (e.g., immobilization, long hospital stay)    Negative: Hip or leg fracture (broken bone) in past month (or had cast on leg or ankle in past month)    Negative: Major surgery in the past month    Negative: Long-distance travel in past month (e.g., car, bus, train, plane; with trip lasting 6 or more hours)    Negative: Extra heart beats OR irregular heart beating   (i.e., \"palpitations\")    Negative: Fever > 103 F (39.4 C)    Negative: Fever > 101 F (38.3 C) and over 60 years of age    Negative: Fever > 100.0 F (37.8 C) and bedridden (e.g., nursing home patient, stroke, chronic illness, recovering from surgery)    Negative: Fever > 100.0 F (37.8 C) and diabetes mellitus or weak immune system (e.g., HIV positive, cancer chemo, splenectomy, organ transplant, chronic steroids)    Negative: Periods where breathing stops and then resumes normally and bedridden (e.g., nursing home patient, CVA)    Negative: Pregnant or postpartum (from 0 to 6 weeks after delivery)    Negative: Patient sounds very sick or weak to the triager    Negative: MILD difficulty breathing (e.g., minimal/no SOB at rest, SOB with walking, pulse < 100) of new onset or worse than normal    Negative: Longstanding difficulty breathing (e.g., CHF, COPD, emphysema) and worse than normal    Negative: Longstanding difficulty breathing and not responding to usual therapy    Negative: Continuous (nonstop) coughing    Protocols used: BREATHING DIFFICULTY-A-OH      "

## 2021-06-17 NOTE — PATIENT INSTRUCTIONS - HE
The next step in evaluating your dizziness and heart racing is to do a heart monitor.  The cardiology clinic will call you to schedule this.    Continue taking omeprazole once daily for 2 weeks.  Continue bland foods.  Continue to drink 60 ounces of water or more each day.    I suspect your shortness of breath is related to heartburn or reflux.  This should get better with omeprazole.    There are no signs of heart attack, stroke, or diabetes on your recent evaluations.

## 2021-06-17 NOTE — TELEPHONE ENCOUNTER
----- Message from Alfa Nagel MD sent at 4/29/2021 10:26 AM CDT -----  These inform patient that test results were negative for bacteria causing heartburn

## 2021-06-17 NOTE — PROGRESS NOTES
"    Assessment & Plan     Dk was seen today for panic attack and shortness of breath.    Diagnoses and all orders for this visit:    Abnormal Holter exam    Palpitations  -     Thyroid Stimulating Hormone (TSH)    Shortness of breath    Gastroesophageal reflux disease with esophagitis without hemorrhage    Anxiety        Palpitations with abnormal Holter monitor sinus tachycardia.    I did speak with Dr. Dejesus, she ordered an echocardiogram and a referral to cardiology.  She spoke with the cardiologist.    They will be contacting the patient at [phone]    I did check a TSH level and will contact the patient at the same number.    At this time I think he can stop the Pepcid omeprazole Carafate and albuterol as none of those have been helpful.    He also as noted previously identifies Dr. Nagel as his primary physician and will contact Dr. Nagel in the meantime if he has any additional symptoms.    Again today he has no symptoms         BMI:   Estimated body mass index is 27.88 kg/m  as calculated from the following:    Height as of 5/13/21: 5' 4.5\" (1.638 m).    Weight as of this encounter: 165 lb (74.8 kg).       Return in about 2 weeks (around 6/7/2021) for Recheck.    Jason Schaeffer MD  [facility]    Subjective   Dk Reyna is 20 y.o. and presents today for the following health issues   HPI   This patient was sent by the RN triage today for ongoing symptoms of his palpitation and shortness of breath.    He was seen for a physical by Dr. Nagel at the [facility] on 4/22/2021.  He was already seen at the emergency room on 5/8/2021 for some chest pain and shortness of breath.  He was seen for headache and palpitations on 5/10/2021 by Dr. Dejesus at the [facility].  He was seen 5/13/2021 by Dr. Nagel at the [facility].  He was seen 5/22/2021 for shortness of breath at the walk-in clinic in [place].  And he is seen today at the [facility].    He identifies " Dr. Nagel is his primary physician    I see that he had work-up with EKG and Holter monitor and chest x-ray and labs.    The Holter monitor did show sinus tachycardia up to 138 there was some T wave inversions with tachycardia and ST depression associated with this.    It looks like he has been referred to a cardiologist and referred for an echocardiogram by Dr. Dejesus, but the patient knows nothing about this.    He states that presently he is having no symptoms.    Yesterday he felt somewhat short of breath and had palpitations.    He has been tried on Pepcid omeprazole and Carafate for reflux.  He does not think any of those work    He has been given inhaler he does not feel that is been helpful.          Review of Systems  10 point review of systems positive as outlined above otherwise negative      Objective    /77 (Patient Site: Right Arm, Patient Position: Sitting, Cuff Size: Adult Regular)   Pulse 79   Temp 98  F (36.7  C) (Other)   Wt 165 lb (74.8 kg)   SpO2 99%   BMI 27.88 kg/m    Body mass index is 27.88 kg/m .  Physical Exam  Signs are stable O2 sat 99% heart rate was at 80 and regular    HEENT neck nontender oropharynx clear    Lungs are clear throughout no wheezes no rales    Heart is regular S1-S2 no murmur.    There is no chest wall tenderness there is no rashes.    No peripheral edema.    Review of labs were unremarkable, Holter monitor discussed there was some T wave inversions and sinus depression associated with a sinus tachycardia, tachycardia up to 138.    I did order TSH level he has lost 5 pounds he says in the last week or 2

## 2021-06-18 ENCOUNTER — COMMUNICATION - HEALTHEAST (OUTPATIENT)
Dept: FAMILY MEDICINE | Facility: CLINIC | Age: 21
End: 2021-06-18

## 2021-06-21 NOTE — PROGRESS NOTES
[facility] Well Child Check    ASSESSMENT & PLAN  Dk Reyna is a 17 y.o. who has normal growth and normal development.  Pt is accompanied by Mother and present with professional , Barbara.  Pt has no concerns.  Discussed avoiding/quitting smoking and going back to school.  Pt is a senior and has dropped out of school; was working but not anymore and just staying home.  Pt sexually active; encourage protection.    Diagnoses and all orders for this visit:    1.  WCC (well child check)  -  Healthy WCC; no concerns.  Orders:  -     Hearing Screening  -     Vision Screening  -     PHQ9 Depression Screen  -     Influenza, Seasonal,Quad Inj, 36+ MOS  -     Td, Preservative Free (green label)    Return to clinic in 1 year for a Well Child Check or sooner as needed    IMMUNIZATIONS/LABS  Immunizations were reviewed and orders were placed as appropriate.   -  See above.     REFERRALS  Dental:  The patient has already established care with a dentist.  Other:  No additional referrals were made at this time.    ANTICIPATORY GUIDANCE  I have reviewed age appropriate anticipatory guidance.    HEALTH HISTORY  Do you have any concerns that you'd like to discuss today?: No concerns     Roomed by: Melody Ware CMA    Accompanied by Mother    Refills needed? No    Do you have any forms that need to be filled out? No     services provided by:     /Agency Name [facility] Staff Member Barbara   Location of  Services: In person      Do you have any significant health concerns in your family history?: No  No family history on file.  Since your last visit, have there been any major changes in your family, such as a move, job change, separation, divorce, or death in the family?: No  Has a lack of transportation kept you from medical appointments?: No    Home  Who lives in your home?:  Parents and 2 brothers  Social History     Social History Narrative     Not on file     Do  "you have any concerns about losing your housing?: No  Is your housing safe and comfortable?: Yes  Do you have any trouble with sleep?:  No    Education  What school do you child attend?:  None  What grade are you in?:  N/A  How do you perform in school (grades, behavior, attention, homework?: N/A     Eating  Do you eat regular meals including fruits and vegetables?:  yes  What are you drinking (cow's milk, water, soda, juice, sports drinks, energy drinks, etc)?: water, soda and juice; Discussed limiting or avoiding juice, soda, sweet drinks to prevent dental caries, obesity, DM.    Have you been worried that you don't have enough food?: No  Do you have concerns about your body or appearance?:  No    Activities  Do you have friends?:  yes  Do you get at least one hour of physical activity per day?:  yes  How many hours a day are you in front of a screen other than for schoolwork (computer, TV, phone)?:  \"Almost all day\" ; Encourage having less than 2 hours of screen time.   What do you do for exercise?:  Nothing; Encourage having at least 30 minutes of exercise everyday.   Do you have interest/participate in community activities/volunteers/school sports?:  no    MENTAL HEALTH SCREENING  PHQ-2 Total Score: 2 (11/13/2018  1:00 PM)    PHQ-9 Total Score: 4 (11/13/2018  1:00 PM)    VISION/HEARING  Vision: Completed. See Results  Hearing:  Completed. See Results     Hearing Screening    Method: Audiometry    125Hz 250Hz 500Hz 1000Hz 2000Hz 3000Hz 4000Hz 6000Hz 8000Hz   Right ear:   25 20 20  20 20    Left ear:   25 20 20  20 20       Visual Acuity Screening    Right eye Left eye Both eyes   Without correction: 20/20 20/20 20/20   With correction:        TB Risk Assessment:  The patient and/or parent/guardian answer positive to:  parents born outside of the [place]    Dyslipidemia Risk Screening  Have either of your parents or any of your grandparents had a stroke or heart attack before age 55?: No  Any parents with high " "cholesterol or currently taking medications to treat?: No     Dental  When was the last time you saw the dentist?: 1-3 months ago     Patient Active Problem List   Diagnosis     Decrease In Appetite     Contusion Of Lung     Open Wound Of The Scalp     Acute Sore Throat     Acute Lymphadenitis     Drugs  Does the patient use tobacco/alcohol/drugs?:  Currently smoking, apx 5 cig/day. Advised smoking cessation/limiting. Discussed effects of smoking on overall health.   Pt will try.     Safety  Does the patient have any safety concerns (peer or home)?:  no  Does the patient use safety belts, helmets and other safety equipment?:  yes    Sex  Have you ever had sex?:  Yes.  Advise safe sex and using protection.     MEASUREMENTS  Height:  5' 4.5\" (1.638 m)  Weight: 150 lb (68 kg)  BMI: Body mass index is 25.35 kg/m .  Blood Pressure: 138/88  Blood pressure percentiles are 97 % systolic and 98 % diastolic based on the 2017 AAP Clinical Practice Guideline. Blood pressure percentile targets: 90: 131/79, 95: 135/82, 95 + 12 mmH/94. This reading is in the Stage 1 hypertension range (BP >= 130/80).    PHYSICAL EXAM    Constitutional:  Well-developed and well-nourished, active, no apparent distress.   HEENT: Head atraumatic, normocephalic. TM's intact.  Ext canals with no erythema or discharge.  PERR. Conjuctivae clear, no discharge or erythema.  Nose:  Nostrils patent, no polyps.  Mouth/Throat: Pharynx clear.  Mucous membranes moist, without lesions.  Dentition normal; no gingivitis.  Neck: Normal range of motion, trachea midline.   Cardiovascular: Normal RRR, no murmurs.   Pulmonary/Chest: Respiration without effort, normal breath sounds. Lungs sound clear bilaterally, without wheezes or crackles.   Abdominal: Soft, normal bowel sounds. No masses, organomegaly, rigidity, or guarding.  Genitourinary: Normal external genitalia. No lesions, masses, rashes. No paraphimosis. Testes descended bilaterally. No hernias. "   Musculoskeletal: Normal range of motion.  Vertebrae without deformity.  No edema, tenderness or deformity.   Lymphadenopathy:  No cervical adenopathy.   Neurological:  Alert. Normal reflexes, tone and strength.   Skin: Skin is warm and dry, no rash or lesions, no jaundice.   Psych:  Interactive, appears normal and appropriate for age.      Roshni Means PA-C

## 2021-06-23 ENCOUNTER — RECORDS - HEALTHEAST (OUTPATIENT)
Dept: ADMINISTRATIVE | Facility: OTHER | Age: 21
End: 2021-06-23

## 2021-06-25 NOTE — TELEPHONE ENCOUNTER
Please asked patient to stop Celebrex he can take his stomach medications which consist of omeprazole and Carafate.

## 2021-06-25 NOTE — PROGRESS NOTES
"TCM DISCHARGE FOLLOW UP CALL      \"Hi, my name is  Constantino CALLAHAN CHLONDON and I am calling from [facility].  I am calling to follow up and see how things are going for you after your recent hospitalization.\"    \"Tell me how you are doing now that you are home?\" \"My symptoms are still not improving and stilling having not enough breathing\".      Discharge Instructions    \"Do you understand your discharge instructions?  Pt. Response: \"Yes\".       \"Has an appointment with your primary care provider been scheduled?\" \"Yes\".  \"If yes, when is that appointment? \"5/27/2021 at 2:00 PM.  If no, date of appointment scheduled: Scheduled.    \"I can assist you to schedule that now.\"  CC refer to AVS to schedule when instructed.     Medications    \"Did you have any medication changes?\"  Yes.  \"Do you have any concerns with obtaining the medications or questions about your medications that you would like a RN to review with you?\" No.  **If yes, escalate to CC RN responsible for patient's clinic or [facility] RN\" Send an inbasket Epic message if RN is unavailable after call.     \"When you see the provider, I would recommend that you bring your medications with you.\"    Call Summary    \"What questions or concerns do you have about your recent visit and your follow-up care?\" No questions, I will follow up with my doctor as scheduled and will go to ED if symptoms are not improving or worsening.             Can be addressed if scheduled with RN or SW either Care Coordination or if declining to triage for further questions.       \"If you have questions or things don't continue to improve, we encourage you contact us through the main clinic number [phone] (give number).  Even if the clinic is not open, triage nurses are available 24/7 to help you.        \"I am with Clinic Care Coordination, and we are a team of nurses, social workers, community health workers, and financial resource workers. We work together with your primary " "doctor.   We are here to see if we can help you with a variety of things - from resources in the community such as food assistance, transportation, help in the home, equipment needs, assistance with medications, coordination of your appointments, help with any medical questions, and things like this.\"    \"We would have a nurse or  speak with you and together come up with goals to help you to improve your health and wellness and do the best to help you stay out of the hospital.\"         If the patient agrees, then explain that the appointment can be in person (If has RN or SW in clinic) or on the telephone if remote or if easier.     \"I would like to assist to help you to make that appointment now.  The next available appointment is...... .  \"    Appointment for Care Coordination assessment has been made with ____NA_____ on (Date) at___NA____ (time)    Send in basket to CCC RN at clinic or in hub to put in order.     "

## 2021-06-25 NOTE — PROGRESS NOTES
ASSESSMENT and plan  1. Gastroesophageal reflux disease with esophagitis without hemorrhage  Continue omeprazole continue Carafate GI referral for endoscopy.  Have cautioned the patient not to drink any alcohol.  He did not bring his medications in for review I will have him follow-up within 2 weeks here in clinic  - omeprazole (PRILOSEC) 20 MG capsule; Take 1 capsule (20 mg total) by mouth daily before breakfast.  Dispense: 30 capsule; Refill: 0  - Ambulatory referral for Upper GI Endoscopy  - sucralfate (CARAFATE) 1 gram tablet; Take 1 tablet (1 g total) by mouth 4 (four) times a day.  Dispense: 120 tablet; Refill: 1    2. Acute alcoholic gastritis without hemorrhage  Patient denies alcohol intake he has been seen at the ER he has been seen at the walk-in clinic he has been seen by Dr. Schaeffer.  - omeprazole (PRILOSEC) 20 MG capsule; Take 1 capsule (20 mg total) by mouth daily before breakfast.  Dispense: 30 capsule; Refill: 0    3. Costochondritis  Patient complains of chest wall pain.  This is reproducible on the left costochondral area Celebrex to be started.  Side effects of medication discussed with patient  - celecoxib (CELEBREX) 200 MG capsule; Take 1 capsule (200 mg total) by mouth 2 (two) times a day.  Dispense: 60 capsule; Refill: 2    4. Chest wall pain    - Lipid [place] RANDOM        There are no Patient Instructions on file for this visit.    Orders Placed This Encounter   Procedures     Lipid [place] RANDOM     Order Specific Question:   Fasting is required?     Answer:   No     Ambulatory referral for Upper GI Endoscopy     Referral Priority:   Routine     Referral Type:   Diagnostic Imaging     Referral Reason:   Evaluation and Treatment     Requested Specialty:   Gastroenterology     Number of Visits Requested:   1     Medications Discontinued During This Encounter   Medication Reason     omeprazole (PRILOSEC) 20 MG capsule Reorder     sucralfate (CARAFATE) 1 gram tablet Reorder     sucralfate  (CARAFATE) 1 gram tablet Reorder       No follow-ups on file.    CHIEF COMPLAINT:  Chief Complaint   Patient presents with     Heart Concerns     feels like something is poking his heart     Shortness of Breath     Dizziness     Fatigue       HISTORY OF PRESENT ILLNESS:  Dk is a 20 y.o. male who is here for follow-up for abdominal burning and chest wall pain.  He has been seen in the ER recently for shortness of breath less than 3 days ago.  Prior to that he was seen 1 day earlier by Dr. Schaeffer the [facility] clinic 2 days before that he was seen in the walk-in clinic.  He has had multiple series of test results which include an EKG which was read as normal he has had a numerous serological tests which have been unequivocal.  He has had a Holter test done on 5/10/2020 that showed some PACs.  Patient complains that he has been short of breath because of abdominal pain and burning which radiates into the lower part of his left chest wall and feels like something is pushing on his heart and this causes him difficulty.  He says the symptoms are not related to exertion.  He denies any exercise involvement.  He denies drinking alcohol.  He states that the medications that are given in the past for abdominal pain helped to a certain degree but he still has acid reflux he states that he gets worse abdominal burning with eating certain kinds of meat.  He denies any night sweats or diaphoresis.  He denies any fainting or weakness.    REVIEW OF SYSTEMS:     Musculoskeletal positive for left chest wall pain  GI positive for abdominal burning and discomfort  Pulmonary occasional shortness of breath noted  10 point review of  All other systems are negative.    PFSH:    Social history reviewed    TOBACCO USE:  Social History     Tobacco Use   Smoking Status Former Smoker     Types: Cigarettes   Smokeless Tobacco Never Used       VITALS:  Vitals:    05/27/21 1410   BP: 130/88   Pulse: 80   Resp: 18   Temp: 97.8  F (36.6  C)  "  TempSrc: Oral   Weight: 159 lb 5 oz (72.3 kg)   Height: 5' 4.5\" (1.638 m)     Wt Readings from Last 3 Encounters:   05/27/21 159 lb 5 oz (72.3 kg)   05/25/21 165 lb (74.8 kg)   05/24/21 165 lb (74.8 kg)       PHYSICAL EXAM:  Interactive male sitting [place] exam no acute distress  HEENT neck supple mucous members moist oral cavity shows no exudate no erythema  Respiratory system clear to auscultation equal breath sounds no wheezes no crackles  CVS regular rate and rhythm no murmurs no rubs no gallops pedal edema is not present  Abdomen soft epigastric tenderness noted no hepatosplenomegaly.  Psych oriented x3 well-groomed not agitated maintains eye contact    DATA REVIEWED:  Additional History from Old Records Summarized (2): 0  Decision to Obtain Records (1): 0  Radiology Tests Summarized or Ordered (1): 0  Labs Reviewed or Ordered (1): 0  Medicine Test Summarized or Ordered (1): 0  Independent Review of EKG or X-RAY(2 each): 0    The visit lasted a total of 40 minutes    MEDICATIONS:  Current Outpatient Medications   Medication Sig Dispense Refill     albuterol (PROAIR HFA;PROVENTIL HFA;VENTOLIN HFA) 90 mcg/actuation inhaler Inhale 2 puffs every 6 (six) hours as needed for wheezing. 1 each 0     celecoxib (CELEBREX) 200 MG capsule Take 1 capsule (200 mg total) by mouth 2 (two) times a day. 60 capsule 2     famotidine (PEPCID) 20 MG tablet Take 20 mg by mouth 2 (two) times a day.       hydrOXYzine HCL (ATARAX) 25 MG tablet Take 1 tablet (25 mg total) by mouth every 6 (six) hours as needed for anxiety. 12 tablet 0     omeprazole (PRILOSEC) 20 MG capsule Take 1 capsule (20 mg total) by mouth daily before breakfast. 30 capsule 0     sucralfate (CARAFATE) 1 gram tablet Take 1 tablet (1 g total) by mouth 4 (four) times a day. 120 tablet 1     No current facility-administered medications for this visit.        "

## 2021-06-25 NOTE — TELEPHONE ENCOUNTER
----- Message from Alfa Nagel MD sent at 5/28/2021  7:11 AM CDT -----  Please call and let the patient know that his cholesterol results are high and he should change his diet, he should not be having fried oily foods or deep-fried foods this includes avoiding deep-fried rice and vegetables please send a copy results to his home thank you

## 2021-06-25 NOTE — TELEPHONE ENCOUNTER
Reason for Call:  Other returning call      Detailed comments: Patient returned call. Notified per MD note below.     Phone Number Patient can be reached at: Cell number on file:    Telephone Information:   Mobile [phone]   Mobile [phone]     Can we leave a detailed message on this number?: No call back needed    Call taken on 5/28/2021 at 10:35 AM by Alex Smith

## 2021-06-25 NOTE — PROGRESS NOTES
Was paged by answering service with patient phone number to call him directly, not sure why it did not go to the triage nurse, I did return the call,I spent  about 15-minute discussing patient's symptoms, has been complaining of chest pain, high blood pressure, cold extremities,  ultimately based on patient symptoms acuity, I instructed him to get an evaluation at the emergency room, I offered him an ambulance but he declined it, stating that wife will drive him over there.

## 2021-06-25 NOTE — TELEPHONE ENCOUNTER
MD please note that patient states that he developed shortness of breath (push out to breath and had wheezing) after taking his medicine yesterday (Celebrex + Sucralfate). Patient took them 30 minutes apart, took Celebrex first and then Sucralfate. Does not know which one caused the reaction with shortness of breath. The shortness of breath lasted for 1 hour.     Patient notified not to take any more medicine UNTIL he hears from MD.

## 2021-06-26 NOTE — PROGRESS NOTES
ASSESSMENT and plan   1. Abdominal bloating  He is very gassy and has been complaining of abdominal bloating  - simethicone (MYLICON) 80 MG chewable tablet; Chew 1 tablet (80 mg total) 4 (four) times a day as needed for flatulence.  Dispense: 100 tablet; Refill: 2    2. Other chronic gastritis without hemorrhage  Patient is denying any alcohol use he still smoking of asked him to stop this    3. Gastroesophageal reflux disease with esophagitis without hemorrhage    Omeprazole to be completed for 1 more month only he is going to complete the sucralfate in approximately 2 weeks.  I assured him that the majority of his complaints are due to increased acid production.  He should avoid spicy food  - omeprazole (PRILOSEC) 20 MG capsule; Take 1 capsule (20 mg total) by mouth daily before breakfast.  Dispense: 30 capsule; Refill: 0    4. Acute alcoholic gastritis without hemorrhage    - omeprazole (PRILOSEC) 20 MG capsule; Take 1 capsule (20 mg total) by mouth daily before breakfast.  Dispense: 30 capsule; Refill: 0              There are no Patient Instructions on file for this visit.    No orders of the defined types were placed in this encounter.    Medications Discontinued During This Encounter   Medication Reason     omeprazole (PRILOSEC) 20 MG capsule Reorder     celecoxib (CELEBREX) 200 MG capsule Error       No follow-ups on file.    CHIEF COMPLAINT:  Chief Complaint   Patient presents with     Follow-up     abd pain     Neck Pain     head hurts and feels like something is poking him on right side       HISTORY OF PRESENT ILLNESS:  Dk is a 20 y.o. male   Who is here for follow-up for gastritis and abdominal pain.  His wife is also present.  He has been taking the Carafate and omeprazole daily.  He says his abdominal burning is decreased it started again for only 1 day when he ate some spicy food.  He denies having any diarrhea.  He occasionally will have some chest wall discomfort but no chest pain no  "palpitations no shortness of breath his wife reports that he checks his blood pressure for 5 times a day and is always anxious about it.  He denies having the dizzy spells.  He states that he is not exercising.  He says sometimes he can sleep because he thinks his heart has a defect.  He is scheduled for an echocardiogram coming up.  He states that he thinks the omeprazole is helping.  He has not had any alcohol for more than 5 weeks.  He continues to smoke.    REVIEW OF SYSTEMS:     GI positive for abdominal bloating and abdominal burning pain which radiates into the left chest wall  12 point review of  All other systems are negative.    PFSH:  Social history reviewed    TOBACCO USE:  Social History     Tobacco Use   Smoking Status Former Smoker     Types: Cigarettes   Smokeless Tobacco Never Used       VITALS:  Vitals:    06/10/21 1430   BP: 126/88   Pulse: 88   Temp: 98.3  F (36.8  C)   TempSrc: Oral   SpO2: 98%   Weight: 160 lb 9 oz (72.8 kg)   Height: 5' 4.5\" (1.638 m)     Wt Readings from Last 3 Encounters:   06/10/21 160 lb 9 oz (72.8 kg)   05/27/21 159 lb 5 oz (72.3 kg)   05/25/21 165 lb (74.8 kg)       PHYSICAL EXAM:  Interactive well-built male sitting comfortably in the exam room no acute distress  HEENT neck supple mucous members moist, the oral cavity shows no exudate no erythema  Respiratory system clear to auscultation equal breath sounds no wheeze no crackles  CVS regular rate and rhythm no murmurs no rubs no gallops  Abdomen soft is no focal tenderness no hepatosplenomegaly.  Bowel sounds are absent.    DATA REVIEWED:  Additional History from Old Records Summarized (2): 0  Decision to Obtain Records (1): 0  Radiology Tests Summarized or Ordered (1): 0  Labs Reviewed or Ordered (1): 0  Medicine Test Summarized or Ordered (1): 0  Independent Review of EKG or X-RAY(2 each): 0    The visit lasted a total of 30 minutes     MEDICATIONS:  Current Outpatient Medications   Medication Sig Dispense Refill     " omeprazole (PRILOSEC) 20 MG capsule Take 1 capsule (20 mg total) by mouth daily before breakfast. 30 capsule 0     sucralfate (CARAFATE) 1 gram tablet Take 1 tablet (1 g total) by mouth 4 (four) times a day. 120 tablet 1     albuterol (PROAIR HFA;PROVENTIL HFA;VENTOLIN HFA) 90 mcg/actuation inhaler Inhale 2 puffs every 6 (six) hours as needed for wheezing. 1 each 0     famotidine (PEPCID) 20 MG tablet Take 20 mg by mouth 2 (two) times a day.       hydrOXYzine HCL (ATARAX) 25 MG tablet Take 1 tablet (25 mg total) by mouth every 6 (six) hours as needed for anxiety. 12 tablet 0     simethicone (MYLICON) 80 MG chewable tablet Chew 1 tablet (80 mg total) 4 (four) times a day as needed for flatulence. 100 tablet 2     No current facility-administered medications for this visit.

## 2021-07-04 NOTE — LETTER
Letter by Alfa Nagel MD at      Author: Alfa Nagel MD Service: -- Author Type: --    Filed:  Encounter Date: 5/28/2021 Status: (Other)         Dk Reyna  [address] [place] [id number]             May 28, 2021         Dear Mr. Reyna,    Below are the results from your recent visit:    Resulted Orders   Lipid [place] RANDOM   Result Value Ref Range    Cholesterol 301 (H) <=199 mg/dL    Triglycerides 211 (H) <=149 mg/dL    HDL Cholesterol 43 >=40 mg/dL    LDL Calculated 216 (H) <=129 mg/dL    Patient Fasting > 8hrs? No      Your cholesterol is elevated. Please avoid deep-fried, oily, or high-fat foods.     Please call with questions or contact us using [facility].    Sincerely,        Electronically signed by Alfa Nagel MD

## 2021-07-04 NOTE — LETTER
Letter by Kathe Dejesus MD at      Author: Kathe Dejesus MD Service: -- Author Type: --    Filed:  Encounter Date: 6/18/2021 Status: (Other)         Dk Reyna  [address] [place] [id number]             June 18, 2021        Dear Mr. Reyna,    Below are the results from your recent visit:      Your recent ECHO (heart ultrasound) does not identify any concerning structural abnormalities or any cause of your symptoms.  Please follow up with your primary care provider.      Please call with questions or contact us using [employer].    Sincerely,        Electronically signed by Kathe Dejesus MD

## 2021-07-06 VITALS
OXYGEN SATURATION: 98 % | HEART RATE: 80 BPM | TEMPERATURE: 99 F | DIASTOLIC BLOOD PRESSURE: 81 MMHG | RESPIRATION RATE: 18 BRPM | SYSTOLIC BLOOD PRESSURE: 126 MMHG

## 2021-07-06 VITALS
SYSTOLIC BLOOD PRESSURE: 125 MMHG | DIASTOLIC BLOOD PRESSURE: 77 MMHG | HEART RATE: 79 BPM | WEIGHT: 165 LBS | BODY MASS INDEX: 27.88 KG/M2 | OXYGEN SATURATION: 99 % | TEMPERATURE: 98 F

## 2021-07-06 VITALS — WEIGHT: 160 LBS | HEIGHT: 65 IN | BODY MASS INDEX: 26.66 KG/M2

## 2021-07-06 VITALS
HEIGHT: 65 IN | DIASTOLIC BLOOD PRESSURE: 88 MMHG | BODY MASS INDEX: 26.75 KG/M2 | WEIGHT: 160.56 LBS | OXYGEN SATURATION: 98 % | SYSTOLIC BLOOD PRESSURE: 126 MMHG | HEART RATE: 88 BPM | TEMPERATURE: 98.3 F

## 2021-07-06 VITALS
SYSTOLIC BLOOD PRESSURE: 130 MMHG | WEIGHT: 159.31 LBS | TEMPERATURE: 97.8 F | HEART RATE: 80 BPM | BODY MASS INDEX: 26.54 KG/M2 | DIASTOLIC BLOOD PRESSURE: 88 MMHG | HEIGHT: 65 IN | RESPIRATION RATE: 18 BRPM

## 2021-07-08 ENCOUNTER — COMMUNICATION - HEALTHEAST (OUTPATIENT)
Dept: FAMILY MEDICINE | Facility: CLINIC | Age: 21
End: 2021-07-08

## 2021-07-08 DIAGNOSIS — K21.00 GASTROESOPHAGEAL REFLUX DISEASE WITH ESOPHAGITIS WITHOUT HEMORRHAGE: ICD-10-CM

## 2021-07-08 DIAGNOSIS — K29.20 ACUTE ALCOHOLIC GASTRITIS WITHOUT HEMORRHAGE: ICD-10-CM

## 2021-07-08 RX ORDER — SUCRALFATE 1 G/1
TABLET ORAL
Qty: 120 TABLET | Refills: 1 | Status: SHIPPED | OUTPATIENT
Start: 2021-07-08

## 2021-07-08 NOTE — TELEPHONE ENCOUNTER
Telephone Encounter by Calvin Resendez RN at 7/8/2021  8:54 AM     Author: Calvin Resendez RN Service: -- Author Type: Registered Nurse    Filed: 7/8/2021  8:54 AM Encounter Date: 7/8/2021 Status: Signed    : Calvin Resendez, RN (Registered Nurse)       RN cannot approve Refill Request    RN can NOT refill this medication   Patient request early refill. Medication last filled 5/27/21 for qty 30 refill 1. Provider to advise on request. . Last office visit: 6/10/2021 Alfa Nagel MD Last Physical: 4/22/2021 Last MTM visit: Visit date not found Last visit same specialty: 6/10/2021 Alfa Nagel MD.  Next visit within 3 mo: Visit date not found  Next physical within 3 mo: Visit date not found      [facility] Connection Triage/Med Refill 7/8/2021    Requested Prescriptions   Pending Prescriptions Disp Refills   ? sucralfate (CARAFATE) 1 gram tablet [Pharmacy Med Name: SUCRALFATE 1GM TABLETS] 120 tablet 1     Sig: TAKE 1 TABLET(1 GRAM) BY MOUTH FOUR TIMES DAILY       GI Medications Refill Protocol Passed - 7/8/2021  7:11 AM        Passed - PCP or prescribing provider visit in last 12 or next 3 months.     Last office visit with prescriber/PCP: 6/10/2021 Alfa Nagel MD OR same dept: 6/10/2021 Alfa Nagel MD OR same specialty: 6/10/2021 Alfa Nagel MD  Last physical: 4/22/2021 Last MTM visit: Visit date not found   Next visit within 3 mo: Visit date not found  Next physical within 3 mo: Visit date not found  Prescriber OR PCP: Alfa Nagel MD  Last diagnosis associated with med order: 1. Gastroesophageal reflux disease with esophagitis without hemorrhage  - sucralfate (CARAFATE) 1 gram tablet [Pharmacy Med Name: SUCRALFATE 1GM TABLETS]; TAKE 1 TABLET(1 GRAM) BY MOUTH FOUR TIMES DAILY  Dispense: 120 tablet; Refill: 1  - omeprazole (PRILOSEC) 20 MG capsule; TAKE 1 CAPSULE(20 MG) BY MOUTH DAILY BEFORE BREAKFAST  Dispense: 90 capsule; Refill: 3    2. Acute alcoholic gastritis without hemorrhage  - omeprazole (PRILOSEC)  20 MG capsule; TAKE 1 CAPSULE(20 MG) BY MOUTH DAILY BEFORE BREAKFAST  Dispense: 90 capsule; Refill: 3    If protocol passes may refill for 12 months if within 3 months of last provider visit (or a total of 15 months).              Signed Prescriptions Disp Refills    omeprazole (PRILOSEC) 20 MG capsule 90 capsule 3     Sig: TAKE 1 CAPSULE(20 MG) BY MOUTH DAILY BEFORE BREAKFAST       GI Medications Refill Protocol Passed - 7/8/2021  7:11 AM        Passed - PCP or prescribing provider visit in last 12 or next 3 months.     Last office visit with prescriber/PCP: 6/10/2021 Alfa Nagel MD OR same dept: 6/10/2021 Alfa Nagel MD OR same specialty: 6/10/2021 Alfa Nagel MD  Last physical: 4/22/2021 Last MTM visit: Visit date not found   Next visit within 3 mo: Visit date not found  Next physical within 3 mo: Visit date not found  Prescriber OR PCP: Alfa Nagel MD  Last diagnosis associated with med order: 1. Gastroesophageal reflux disease with esophagitis without hemorrhage  - sucralfate (CARAFATE) 1 gram tablet [Pharmacy Med Name: SUCRALFATE 1GM TABLETS]; TAKE 1 TABLET(1 GRAM) BY MOUTH FOUR TIMES DAILY  Dispense: 120 tablet; Refill: 1  - omeprazole (PRILOSEC) 20 MG capsule; TAKE 1 CAPSULE(20 MG) BY MOUTH DAILY BEFORE BREAKFAST  Dispense: 90 capsule; Refill: 3    2. Acute alcoholic gastritis without hemorrhage  - omeprazole (PRILOSEC) 20 MG capsule; TAKE 1 CAPSULE(20 MG) BY MOUTH DAILY BEFORE BREAKFAST  Dispense: 90 capsule; Refill: 3    If protocol passes may refill for 12 months if within 3 months of last provider visit (or a total of 15 months).

## 2021-07-08 NOTE — TELEPHONE ENCOUNTER
Telephone Encounter by Calvin Resendez, RN at 7/8/2021  8:52 AM     Author: Calvin Resendez, RN Service: -- Author Type: Registered Nurse    Filed: 7/8/2021  8:54 AM Encounter Date: 7/8/2021 Status: Signed    : Calvin Resendez, RN (Registered Nurse)       Refill Approved    Rx renewed per Medication Renewal Policy. Medication was last renewed on 6/10/21.    Calvin Resendez, [facility] Connection Triage/Med Refill 7/8/2021     Requested Prescriptions   Pending Prescriptions Disp Refills   ? sucralfate (CARAFATE) 1 gram tablet [Pharmacy Med Name: SUCRALFATE 1GM TABLETS] 120 tablet 1     Sig: TAKE 1 TABLET(1 GRAM) BY MOUTH FOUR TIMES DAILY       GI Medications Refill Protocol Passed - 7/8/2021  7:11 AM        Passed - PCP or prescribing provider visit in last 12 or next 3 months.     Last office visit with prescriber/PCP: 6/10/2021 Alfa Nagel MD OR same dept: 6/10/2021 Alfa Nagel MD OR same specialty: 6/10/2021 Alfa Nagel MD  Last physical: 4/22/2021 Last MTM visit: Visit date not found   Next visit within 3 mo: Visit date not found  Next physical within 3 mo: Visit date not found  Prescriber OR PCP: Alfa Nagel MD  Last diagnosis associated with med order: 1. Gastroesophageal reflux disease with esophagitis without hemorrhage  - sucralfate (CARAFATE) 1 gram tablet [Pharmacy Med Name: SUCRALFATE 1GM TABLETS]; TAKE 1 TABLET(1 GRAM) BY MOUTH FOUR TIMES DAILY  Dispense: 120 tablet; Refill: 1  - omeprazole (PRILOSEC) 20 MG capsule [Pharmacy Med Name: OMEPRAZOLE 20MG CAPSULES]; TAKE 1 CAPSULE(20 MG) BY MOUTH DAILY BEFORE BREAKFAST  Dispense: 30 capsule; Refill: 0    2. Acute alcoholic gastritis without hemorrhage  - omeprazole (PRILOSEC) 20 MG capsule [Pharmacy Med Name: OMEPRAZOLE 20MG CAPSULES]; TAKE 1 CAPSULE(20 MG) BY MOUTH DAILY BEFORE BREAKFAST  Dispense: 30 capsule; Refill: 0    If protocol passes may refill for 12 months if within 3 months of last provider visit (or a total of 15 months).              ? omeprazole  (PRILOSEC) 20 MG capsule [Pharmacy Med Name: OMEPRAZOLE 20MG CAPSULES] 30 capsule 0     Sig: TAKE 1 CAPSULE(20 MG) BY MOUTH DAILY BEFORE BREAKFAST       GI Medications Refill Protocol Passed - 7/8/2021  7:11 AM        Passed - PCP or prescribing provider visit in last 12 or next 3 months.     Last office visit with prescriber/PCP: 6/10/2021 Alfa Nagel MD OR same dept: 6/10/2021 Alfa Nagel MD OR same specialty: 6/10/2021 Alfa Nagel MD  Last physical: 4/22/2021 Last MTM visit: Visit date not found   Next visit within 3 mo: Visit date not found  Next physical within 3 mo: Visit date not found  Prescriber OR PCP: Alfa Nagel MD  Last diagnosis associated with med order: 1. Gastroesophageal reflux disease with esophagitis without hemorrhage  - sucralfate (CARAFATE) 1 gram tablet [Pharmacy Med Name: SUCRALFATE 1GM TABLETS]; TAKE 1 TABLET(1 GRAM) BY MOUTH FOUR TIMES DAILY  Dispense: 120 tablet; Refill: 1  - omeprazole (PRILOSEC) 20 MG capsule [Pharmacy Med Name: OMEPRAZOLE 20MG CAPSULES]; TAKE 1 CAPSULE(20 MG) BY MOUTH DAILY BEFORE BREAKFAST  Dispense: 30 capsule; Refill: 0    2. Acute alcoholic gastritis without hemorrhage  - omeprazole (PRILOSEC) 20 MG capsule [Pharmacy Med Name: OMEPRAZOLE 20MG CAPSULES]; TAKE 1 CAPSULE(20 MG) BY MOUTH DAILY BEFORE BREAKFAST  Dispense: 30 capsule; Refill: 0    If protocol passes may refill for 12 months if within 3 months of last provider visit (or a total of 15 months).

## 2021-07-15 ENCOUNTER — NURSE TRIAGE (OUTPATIENT)
Dept: NURSING | Facility: CLINIC | Age: 21
End: 2021-07-15

## 2021-07-15 NOTE — TELEPHONE ENCOUNTER
Chart reviewed. Please review findings below.     Last office visit: 06/10/2021  Last ordered:   simethicone (MYLICON) 80 MG chewable tablet 100 tablet 2 6/10/2021     omeprazole (PRILOSEC) 20 MG capsule 90 capsule 3 7/8/2021   Last lab check:   Next appointment:    ASSESSMENT and plan   1. Abdominal bloating  He is very gassy and has been complaining of abdominal bloating  - simethicone (MYLICON) 80 MG chewable tablet; Chew 1 tablet (80 mg total) 4 (four) times a day as needed for flatulence.  Dispense: 100 tablet; Refill: 2     2. Other chronic gastritis without hemorrhage  Patient is denying any alcohol use he still smoking of asked him to stop this     3. Gastroesophageal reflux disease with esophagitis without hemorrhage     Omeprazole to be completed for 1 more month only he is going to complete the sucralfate in approximately 2 weeks.  I assured him that the majority of his complaints are due to increased acid production.  He should avoid spicy food  - omeprazole (PRILOSEC) 20 MG capsule; Take 1 capsule (20 mg total) by mouth daily before breakfast.  Dispense: 30 capsule; Refill: 0     4. Acute alcoholic gastritis without hemorrhage     - omeprazole (PRILOSEC) 20 MG capsule; Take 1 capsule (20 mg total) by mouth daily before breakfast.  Dispense: 30 capsule; Refill: 0

## 2021-07-15 NOTE — TELEPHONE ENCOUNTER
RN triage   Call from pt   Pt states he ran out of omeprazole 2 days ago -- and having increased abd pain and burning - off and on   Also states he has simethicone -- and wants to know if he should still take it   Reviewed prescription for simethicone -- per order = should be taking as needed  Called [facility] [address] -- per pharmacy = pt picked up omeprazole on 7/8- 90 tabs -- and too soon to refill   sharon state they do not have prescription on file for simethicone   Per pt == he did not  omeprazole and does not have omeprazole -- and that he does have simethicone from [employer]s --   Advised pt call/go to pharmacy with bottles to clarify     Please advise     Beatriz Landry RN  [facility]  Triage Nurse Advisor      Reason for Disposition    Caller has NON-URGENT medication question about med that PCP prescribed and triager unable to answer question    Caller requesting a NON-URGENT new prescription or refill and triager unable to refill per department policy    Protocols used: MEDICATION QUESTION CALL-A-[place]

## 2021-08-05 ENCOUNTER — OFFICE VISIT (OUTPATIENT)
Dept: FAMILY MEDICINE | Facility: CLINIC | Age: 21
End: 2021-08-05
Payer: COMMERCIAL

## 2021-08-05 VITALS
RESPIRATION RATE: 16 BRPM | SYSTOLIC BLOOD PRESSURE: 118 MMHG | WEIGHT: 161.5 LBS | HEART RATE: 80 BPM | TEMPERATURE: 98.2 F | DIASTOLIC BLOOD PRESSURE: 74 MMHG | HEIGHT: 65 IN | BODY MASS INDEX: 26.91 KG/M2

## 2021-08-05 DIAGNOSIS — K21.00 GASTROESOPHAGEAL REFLUX DISEASE WITH ESOPHAGITIS WITHOUT HEMORRHAGE: ICD-10-CM

## 2021-08-05 DIAGNOSIS — K29.20 ACUTE ALCOHOLIC GASTRITIS WITHOUT HEMORRHAGE: ICD-10-CM

## 2021-08-05 DIAGNOSIS — R14.0 ABDOMINAL BLOATING: ICD-10-CM

## 2021-08-05 DIAGNOSIS — R07.89 CHEST WALL PAIN: Primary | ICD-10-CM

## 2021-08-05 PROCEDURE — 99214 OFFICE O/P EST MOD 30 MIN: CPT | Performed by: FAMILY MEDICINE

## 2021-08-05 RX ORDER — SIMETHICONE 80 MG
80 TABLET,CHEWABLE ORAL 4 TIMES DAILY PRN
Qty: 100 TABLET | Refills: 2 | Status: SHIPPED | OUTPATIENT
Start: 2021-08-05

## 2021-08-05 ASSESSMENT — MIFFLIN-ST. JEOR: SCORE: 1656.5

## 2021-08-05 NOTE — PROGRESS NOTES
ASSESSMENT and plan   1. Gastroesophageal reflux disease with esophagitis without hemorrhage  Reviewed recent endoscopy done.  Results discussed in detail.  Patient is noting some abdominal discomfort and dyspepsia in the late afternoon.  Continue omeprazole.  Follow-up in 2 months.  - omeprazole (PRILOSEC) 20 MG DR capsule; Take 1 capsule (20 mg) by mouth daily before breakfast  Dispense: 30 capsule; Refill: 3    2. Acute alcoholic gastritis without hemorrhage    Repeat LFTs in approximately 60 days no use of alcohol documented.  Warned him about the use of ingesting alcohol.  - omeprazole (PRILOSEC) 20 MG DR capsule; Take 1 capsule (20 mg) by mouth daily before breakfast  Dispense: 30 capsule; Refill: 3    3. Abdominal bloating    Minimal bloating noted he is changed his diet he is no longer eating spicy food he is taking this medication only once daily  - simethicone (MYLICON) 80 MG chewable tablet; Take 1 tablet (80 mg) by mouth 4 times daily as needed  Dispense: 100 tablet; Refill: 2      4.  Chest wall pain  No chest wall pain noted had recent cardiology visit with Dr. Pham  No further cardiac testing warranted he has PACs he has not had any chest discomfort.        There are no Patient Instructions on file for this visit.    No orders of the defined types were placed in this encounter.    Medications Discontinued During This Encounter   Medication Reason     simethicone (MYLICON) 80 MG chewable tablet Reorder     omeprazole (PRILOSEC) 20 MG capsule Reorder       Return in about 2 months (around 10/5/2021) for gastritis.    CHIEF COMPLAINT:  chief complaint    HISTORY OF PRESENT ILLNESS:  Dk Redmond is a 20 year old male who is here for follow-up he recently saw cardiology for his PACs and also had an endoscopy done.  He reports that his abdominal burning is decreased he only gets in the afternoon sometimes.  No longer eating spicy food no longer smoking no longer consuming alcohol.  He is taking his  "omeprazole daily.  He takes his simethicone only once a day denies any bloating.  He states his appetites been normal.  He has not noticed any diarrhea.    REVIEW OF SYSTEMS:     GI occasional abdominal discomfort noted only in the afternoons  12 point review of  All other systems are negative.    PFSH:    Social history reviewed    TOBACCO USE:  History   Smoking Status     Former Smoker     Types: Cigarettes   Smokeless Tobacco     Never Used       VITALS:  Vitals:    08/05/21 1414   BP: 118/74   Pulse: 80   Resp: 16   Temp: 98.2  F (36.8  C)   TempSrc: Oral   Weight: 73.3 kg (161 lb 8 oz)   Height: 1.638 m (5' 4.5\")     Wt Readings from Last 3 Encounters:   08/05/21 73.3 kg (161 lb 8 oz)   06/22/21 73.9 kg (163 lb)   06/10/21 72.8 kg (160 lb 9 oz)       PHYSICAL EXAM:  Interactive male sitting comfortably in exam room in no acute distress  HEENT neck supple mucous members moist the oral cavity shows no exudate no edema  Respiratory system clear to auscultation equal breath sounds no wheezes no crackles  CVS regular rate and rhythm no murmurs no rubs no gallops   Abdomen soft there is no focal tenderness no hepatosplenomegaly bowel sounds are absent    DATA REVIEWED:  Additional History from Old Records Summarized (2): 0  Decision to Obtain Records (1): 0  Radiology Tests Summarized or Ordered (1): 0  Labs Reviewed or Ordered (1): 0  Medicine Test Summarized or Ordered (1): 0  Independent Review of EKG or X-RAY(2 each): 0    The visit lasted a total of 30 minutes .    MEDICATIONS:  Current Outpatient Medications   Medication Sig Dispense Refill     omeprazole (PRILOSEC) 20 MG DR capsule Take 1 capsule (20 mg) by mouth daily before breakfast 30 capsule 3     simethicone (MYLICON) 80 MG chewable tablet Take 1 tablet (80 mg) by mouth 4 times daily as needed 100 tablet 2     albuterol (PROAIR HFA;PROVENTIL HFA;VENTOLIN HFA) 90 mcg/actuation inhaler [ALBUTEROL (PROAIR HFA;PROVENTIL HFA;VENTOLIN HFA) 90 MCG/ACTUATION " INHALER] Inhale 2 puffs every 6 (six) hours as needed for wheezing. 1 each 0     famotidine (PEPCID) 20 MG tablet [FAMOTIDINE (PEPCID) 20 MG TABLET] Take 20 mg by mouth 2 (two) times a day.       hydrOXYzine HCL (ATARAX) 25 MG tablet [HYDROXYZINE HCL (ATARAX) 25 MG TABLET] Take 1 tablet (25 mg total) by mouth every 6 (six) hours as needed for anxiety. 12 tablet 0     omeprazole (PRILOSEC) 20 MG capsule [OMEPRAZOLE (PRILOSEC) 20 MG CAPSULE] TAKE 1 CAPSULE(20 MG) BY MOUTH DAILY BEFORE BREAKFAST 90 capsule 3     sucralfate (CARAFATE) 1 gram tablet [SUCRALFATE (CARAFATE) 1 GRAM TABLET] TAKE 1 TABLET(1 GRAM) BY MOUTH FOUR TIMES DAILY 120 tablet 1     sucralfate (CARAFATE) 1 gram tablet [SUCRALFATE (CARAFATE) 1 GRAM TABLET] Take 1 tablet (1 g total) by mouth 4 (four) times a day. 120 tablet 1

## 2021-11-03 ENCOUNTER — OFFICE VISIT (OUTPATIENT)
Dept: FAMILY MEDICINE | Facility: CLINIC | Age: 21
End: 2021-11-03
Payer: COMMERCIAL

## 2021-11-03 VITALS
DIASTOLIC BLOOD PRESSURE: 78 MMHG | BODY MASS INDEX: 26.99 KG/M2 | RESPIRATION RATE: 16 BRPM | HEART RATE: 89 BPM | WEIGHT: 162 LBS | TEMPERATURE: 97.7 F | SYSTOLIC BLOOD PRESSURE: 114 MMHG | HEIGHT: 65 IN | OXYGEN SATURATION: 98 %

## 2021-11-03 DIAGNOSIS — K21.00 GASTROESOPHAGEAL REFLUX DISEASE WITH ESOPHAGITIS WITHOUT HEMORRHAGE: Primary | ICD-10-CM

## 2021-11-03 DIAGNOSIS — K29.20 ACUTE ALCOHOLIC GASTRITIS WITHOUT HEMORRHAGE: ICD-10-CM

## 2021-11-03 DIAGNOSIS — Z23 NEED FOR IMMUNIZATION AGAINST INFLUENZA: ICD-10-CM

## 2021-11-03 PROCEDURE — 90471 IMMUNIZATION ADMIN: CPT | Performed by: FAMILY MEDICINE

## 2021-11-03 PROCEDURE — 99214 OFFICE O/P EST MOD 30 MIN: CPT | Mod: 25 | Performed by: FAMILY MEDICINE

## 2021-11-03 PROCEDURE — 90686 IIV4 VACC NO PRSV 0.5 ML IM: CPT | Performed by: FAMILY MEDICINE

## 2021-11-03 ASSESSMENT — MIFFLIN-ST. JEOR: SCORE: 1658.77

## 2021-11-03 NOTE — PATIENT INSTRUCTIONS
Keep up the good work    Please do not drink alcohol    Please continue omeprazole till the end of December and then stop    Please call me again if your symptoms restart

## 2021-11-03 NOTE — PROGRESS NOTES
ASSESSMENT and plan   1. Gastroesophageal reflux disease with esophagitis without hemorrhage    Abdominal pain noted twice per week taking metoprolol daily avoiding spicy food continue medication until December and stop medication if symptoms recur patient call back at once for follow-up  - omeprazole (PRILOSEC) 20 MG DR capsule; Take 1 capsule (20 mg) by mouth daily before breakfast  Dispense: 30 capsule; Refill: 3    2. Acute alcoholic gastritis without hemorrhage    Patient stopped using alcohol for more than 7 weeks he notes no issues with craving alcohol.  No abdominal burning present regularly.  - omeprazole (PRILOSEC) 20 MG DR capsule; Take 1 capsule (20 mg) by mouth daily before breakfast  Dispense: 30 capsule; Refill: 3    3.  Encounter for immunization against influenza patient agrees for vaccination today    Patient Instructions   Keep up the good work    Please do not drink alcohol    Please continue omeprazole till the end of December and then stop    Please call me again if your symptoms restart      Orders Placed This Encounter   Procedures     IA FLU VAC PRESRV FREE QUAD SPLIT VIR IM  MONTHS IM     Medications Discontinued During This Encounter   Medication Reason     omeprazole (PRILOSEC) 20 MG DR capsule Reorder       Return in about 6 months (around 5/3/2022) for Routine preventive.    CHIEF COMPLAINT:  chief complaint    HISTORY OF PRESENT ILLNESS:  Robert is a 20 year old male who is following up after more than 7 weeks for gastritis and alcohol use.  He reports he is no longer drinking.  He also reports that he only experiences abdominal pain once or twice a week it lasts for about an hour at a time.  He is avoided eating spicy food he still take the omeprazole.  Bowel movements are regular.  He notes no burning sensation.  He is also decreased his use of the Gas-X as he is no longer feeling bloated or feeling like he has to burp.    REVIEW OF SYSTEMS:   GI occasional abdominal burning noted  "very infrequent  10 point review of  All other systems are negative.    PFSH:    Social history reviewed    TOBACCO USE:  History   Smoking Status     Former Smoker     Types: Cigarettes   Smokeless Tobacco     Never Used       VITALS:  Vitals:    11/03/21 1602   BP: 114/78   Pulse: 89   Resp: 16   Temp: 97.7  F (36.5  C)   TempSrc: Oral   SpO2: 98%   Weight: 73.5 kg (162 lb)   Height: 1.638 m (5' 4.5\")     Wt Readings from Last 3 Encounters:   11/03/21 73.5 kg (162 lb)   08/05/21 73.3 kg (161 lb 8 oz)   06/22/21 73.9 kg (163 lb)       PHYSICAL EXAM:  Interactive male sitting comfortably in exam room no acute distress  HEENT neck supple mucous members moist oral cavity shows no exudate no erythema  respiratory system clear to auscultation equal breath sounds no wheezes no crackles  CVS regular rate rhythm no murmurs rubs gallops appreciated  abdomen soft there is no focal tenderness bowel sounds are present there is no organomegaly.  Psych oriented x3 not agitated well-groomed  DATA REVIEWED:  Additional History from Old Records Summarized (2): 0  Decision to Obtain Records (1): 0  Radiology Tests Summarized or Ordered (1): 0  Labs Reviewed or Ordered (1): 0  Medicine Test Summarized or Ordered (1): 0  Independent Review of EKG or X-RAY(2 each): 0    The visit lasted a total of 30 minutes .    MEDICATIONS:  Current Outpatient Medications   Medication Sig Dispense Refill     omeprazole (PRILOSEC) 20 MG DR capsule Take 1 capsule (20 mg) by mouth daily before breakfast 30 capsule 3     albuterol (PROAIR HFA;PROVENTIL HFA;VENTOLIN HFA) 90 mcg/actuation inhaler [ALBUTEROL (PROAIR HFA;PROVENTIL HFA;VENTOLIN HFA) 90 MCG/ACTUATION INHALER] Inhale 2 puffs every 6 (six) hours as needed for wheezing. 1 each 0     famotidine (PEPCID) 20 MG tablet [FAMOTIDINE (PEPCID) 20 MG TABLET] Take 20 mg by mouth 2 (two) times a day.       hydrOXYzine HCL (ATARAX) 25 MG tablet [HYDROXYZINE HCL (ATARAX) 25 MG TABLET] Take 1 tablet (25 mg " total) by mouth every 6 (six) hours as needed for anxiety. 12 tablet 0     omeprazole (PRILOSEC) 20 MG capsule [OMEPRAZOLE (PRILOSEC) 20 MG CAPSULE] TAKE 1 CAPSULE(20 MG) BY MOUTH DAILY BEFORE BREAKFAST 90 capsule 3     simethicone (MYLICON) 80 MG chewable tablet Take 1 tablet (80 mg) by mouth 4 times daily as needed 100 tablet 2     sucralfate (CARAFATE) 1 gram tablet [SUCRALFATE (CARAFATE) 1 GRAM TABLET] TAKE 1 TABLET(1 GRAM) BY MOUTH FOUR TIMES DAILY 120 tablet 1     sucralfate (CARAFATE) 1 gram tablet [SUCRALFATE (CARAFATE) 1 GRAM TABLET] Take 1 tablet (1 g total) by mouth 4 (four) times a day. 120 tablet 1

## 2022-03-03 ENCOUNTER — OFFICE VISIT (OUTPATIENT)
Dept: FAMILY MEDICINE | Facility: CLINIC | Age: 22
End: 2022-03-03
Payer: COMMERCIAL

## 2022-03-03 VITALS
SYSTOLIC BLOOD PRESSURE: 124 MMHG | BODY MASS INDEX: 27.23 KG/M2 | TEMPERATURE: 97.9 F | HEART RATE: 72 BPM | DIASTOLIC BLOOD PRESSURE: 80 MMHG | WEIGHT: 163.44 LBS | HEIGHT: 65 IN | OXYGEN SATURATION: 98 %

## 2022-03-03 DIAGNOSIS — K29.50 OTHER CHRONIC GASTRITIS WITHOUT HEMORRHAGE: Primary | ICD-10-CM

## 2022-03-03 PROCEDURE — 99214 OFFICE O/P EST MOD 30 MIN: CPT | Performed by: FAMILY MEDICINE

## 2022-03-03 NOTE — PROGRESS NOTES
"ASSESSMENT  And plan  1. Other chronic gastritis without hemorrhage    Patient has intermittent epigastric tenderness he has been trying Prilosec OTC when he has a complaints he does not use the medication more than once a week. No vomiting no emesis noted he stopped drinking alcohol and has reduced his smoking. He has regular bowel movements he describes appetite as being normal        There are no Patient Instructions on file for this visit.    No orders of the defined types were placed in this encounter.    There are no discontinued medications.    No follow-ups on file.    CHIEF COMPLAINT:  chief complaint follow-up for abdominal issues    HISTORY OF PRESENT ILLNESS:  Robert is a 21 year old male   He is here today to follow-up on his gastritis. He reports that over the last 2 months has been feeling very well he has been working and is not had abdominal pain at significant sometimes will have a burning when he eats spicy food and he did continue the omeprazole he notes that sometimes he will have some burning and if he takes an over-the-counter Prilosec it is gone he has not taken more than 3 doses of Prilosec in the last 2 weeks. Denies any use of antacids states his bowel movements are normal he is given up alcohol.    REVIEW OF SYSTEMS:     Other than mentioned in HPI 10 point review of  All other systems are negative.    PFSH:    Social history reviewed    TOBACCO USE:  History   Smoking Status     Light Tobacco Smoker     Types: Cigarettes   Smokeless Tobacco     Never Used       VITALS:  Vitals:    03/03/22 1259   BP: 124/80   Pulse: 72   Temp: 97.9  F (36.6  C)   TempSrc: Oral   SpO2: 98%   Weight: 74.1 kg (163 lb 7 oz)   Height: 1.638 m (5' 4.5\")     Wt Readings from Last 3 Encounters:   03/03/22 74.1 kg (163 lb 7 oz)   11/03/21 73.5 kg (162 lb)   08/05/21 73.3 kg (161 lb 8 oz)       PHYSICAL EXAM:    Interactive male sitting comfortably exam in no acute distress  HEENT neck supple mucous members moist " is no erythema noted  On  posterior pharyngeal wall  Respiratory system clear to auscultation   CVS no murmurs rubs gallops appreciated  GI the abdomen soft focal tenderness is not present no hepatosplenomegaly noted bowel sounds are absent    DATA REVIEWED:  Additional History from Old Records Summarized (2): 0  Decision to Obtain Records (1): 0  Radiology Tests Summarized or Ordered (1): 0  Labs Reviewed or Ordered (1): 0  Medicine Test Summarized or Ordered (1): 0  Independent Review of EKG or X-RAY(2 each): 0    The visit lasted a total of 30 minutes .    MEDICATIONS:  Current Outpatient Medications   Medication Sig Dispense Refill     albuterol (PROAIR HFA;PROVENTIL HFA;VENTOLIN HFA) 90 mcg/actuation inhaler [ALBUTEROL (PROAIR HFA;PROVENTIL HFA;VENTOLIN HFA) 90 MCG/ACTUATION INHALER] Inhale 2 puffs every 6 (six) hours as needed for wheezing. 1 each 0     famotidine (PEPCID) 20 MG tablet [FAMOTIDINE (PEPCID) 20 MG TABLET] Take 20 mg by mouth 2 (two) times a day.       hydrOXYzine HCL (ATARAX) 25 MG tablet [HYDROXYZINE HCL (ATARAX) 25 MG TABLET] Take 1 tablet (25 mg total) by mouth every 6 (six) hours as needed for anxiety. 12 tablet 0     omeprazole (PRILOSEC) 20 MG capsule [OMEPRAZOLE (PRILOSEC) 20 MG CAPSULE] TAKE 1 CAPSULE(20 MG) BY MOUTH DAILY BEFORE BREAKFAST 90 capsule 3     omeprazole (PRILOSEC) 20 MG DR capsule Take 1 capsule (20 mg) by mouth daily before breakfast 30 capsule 3     simethicone (MYLICON) 80 MG chewable tablet Take 1 tablet (80 mg) by mouth 4 times daily as needed 100 tablet 2     sucralfate (CARAFATE) 1 gram tablet [SUCRALFATE (CARAFATE) 1 GRAM TABLET] TAKE 1 TABLET(1 GRAM) BY MOUTH FOUR TIMES DAILY 120 tablet 1     sucralfate (CARAFATE) 1 gram tablet [SUCRALFATE (CARAFATE) 1 GRAM TABLET] Take 1 tablet (1 g total) by mouth 4 (four) times a day. 120 tablet 1

## 2022-12-07 ENCOUNTER — TELEPHONE (OUTPATIENT)
Dept: FAMILY MEDICINE | Facility: CLINIC | Age: 22
End: 2022-12-07

## 2022-12-07 ENCOUNTER — OFFICE VISIT (OUTPATIENT)
Dept: FAMILY MEDICINE | Facility: CLINIC | Age: 22
End: 2022-12-07
Payer: COMMERCIAL

## 2022-12-07 ENCOUNTER — NURSE TRIAGE (OUTPATIENT)
Dept: NURSING | Facility: CLINIC | Age: 22
End: 2022-12-07

## 2022-12-07 VITALS
SYSTOLIC BLOOD PRESSURE: 131 MMHG | TEMPERATURE: 98.5 F | WEIGHT: 157.3 LBS | OXYGEN SATURATION: 99 % | HEART RATE: 86 BPM | BODY MASS INDEX: 26.58 KG/M2 | DIASTOLIC BLOOD PRESSURE: 81 MMHG | RESPIRATION RATE: 18 BRPM

## 2022-12-07 DIAGNOSIS — R68.84 JAW PAIN: Primary | ICD-10-CM

## 2022-12-07 LAB
AMYLASE SERPL-CCNC: 78 U/L (ref 28–100)
ERYTHROCYTE [DISTWIDTH] IN BLOOD BY AUTOMATED COUNT: 13.4 % (ref 10–15)
HCT VFR BLD AUTO: 45.6 % (ref 40–53)
HGB BLD-MCNC: 14.8 G/DL (ref 13.3–17.7)
MCH RBC QN AUTO: 27.7 PG (ref 26.5–33)
MCHC RBC AUTO-ENTMCNC: 32.5 G/DL (ref 31.5–36.5)
MCV RBC AUTO: 85 FL (ref 78–100)
PLATELET # BLD AUTO: 413 10E3/UL (ref 150–450)
RBC # BLD AUTO: 5.35 10E6/UL (ref 4.4–5.9)
WBC # BLD AUTO: 15.5 10E3/UL (ref 4–11)

## 2022-12-07 PROCEDURE — 85027 COMPLETE CBC AUTOMATED: CPT | Performed by: PHYSICIAN ASSISTANT

## 2022-12-07 PROCEDURE — 99214 OFFICE O/P EST MOD 30 MIN: CPT | Performed by: PHYSICIAN ASSISTANT

## 2022-12-07 PROCEDURE — 82150 ASSAY OF AMYLASE: CPT | Performed by: PHYSICIAN ASSISTANT

## 2022-12-07 PROCEDURE — 36415 COLL VENOUS BLD VENIPUNCTURE: CPT | Performed by: PHYSICIAN ASSISTANT

## 2022-12-07 RX ORDER — NAPROXEN 500 MG/1
500 TABLET ORAL 2 TIMES DAILY WITH MEALS
Qty: 40 TABLET | Refills: 0 | Status: SHIPPED | OUTPATIENT
Start: 2022-12-07

## 2022-12-07 NOTE — TELEPHONE ENCOUNTER
LMTCB.     Patient's white blood cell count was elevated from today's visit.  I recommend we start him on on an oral antibiotic called Augmentin.  This medication is twice per day for 10 days.  Take the medicine with food.  Follow-up if you are not having any improvement over the course the next 4 days.

## 2022-12-07 NOTE — TELEPHONE ENCOUNTER
Rec'd a call that he missed earlier today from the clinic.     December 7, 2022  Jud Bardales PA-C     RL     5:03 PM  Note  LMTCB.      Patient's white blood cell count was elevated from today's visit.  I recommend we start him on on an oral antibiotic called Augmentin.  This medication is twice per day for 10 days.  Take the medicine with food.  Follow-up if you are not having any improvement over the course the next 4 days.        This information was given to patient and he verbalized understanding. He says he will  the prescription @ [facility]'s in [place] on [address] tonight and begin taking it.     Isabel Agrawal RN Triage Nurse Advisor 5:17 PM 12/7/2022

## 2022-12-07 NOTE — PROGRESS NOTES
Patient presents with:  Jaw Pain: X 12/01. Rt side jaw. Severe pain. Pt states can not open mouth wide or eat. Some swelling.      Clinical Decision Making:  Patient experiencing right jaw pain.  Differential includes but is not limited to parotitis, mumps, TMJ, otitis media, dental carry.  Ear exam appears normal.  Leading suspicion is for parotitis versus TMJ.  CBC is elevated, so the higher suspicion now for parotitis.  No recent travel outside of the country and patient has been at vaccinated for MMR.  Mumps is unlikely.  Patient did not answer phone when I was calling to start patient on Augmentin.  Left voicemail and made telephone encounter.  Patient was started on naproxen for suspected TMJ prior to the patient's departure.      ICD-10-CM    1. Jaw pain  R68.84 Amylase     CBC with platelets     naproxen (NAPROSYN) 500 MG tablet     Amylase     CBC with platelets     amoxicillin-clavulanate (AUGMENTIN) 875-125 MG tablet          Patient Instructions   1. Tempomandibular joint disorder is often caused by overuse; teeth clenching, teeth grinding, gum chewing, or pen/nail biting. I recommend follow up with a dentist for a mouth apparatus to prevent the grinding. Avoid chewy foods, stick to the liquid or soft foods.   2.  I will call you with your lab results once they are available.  If it shows signs of a salivary gland infection I will start you on an antibiotic.  3. Take Tylenol 1000 mg every 6 hours and Naproxen 500 mg every 12 hours for pain/fever.  Do not exceed 4000mg of acetaminophen from any source in a 24 hour period.  Taking Tylenol and Naproxen together may be helpful in reducing pain.  4. Icing the jaw for 20 minutes at a time 2-3 times per day may be helpful for swelling.  5. Follow up if you are not having any improvement in your symptoms over the course of the next 10-14 days.         HPI:  Robert Redmond is a 22 year old male who presents today complaining of right-sided jaw pain that started 6  days ago.  Patient experiencing some swelling and difficulty opening his mouth wide to eat.  Patient also feels a catching symptom when he tries to close his jaw after opening it wide.  He has been taking Tylenol and a pain medicine from [place].  He has not had any recent travel outside of the country.  He denies any fevers or chills.  He is unaware if he grinds his teeth.    History obtained from the patient.    Problem List:  2019-11: Cigarette nicotine dependence without complication  Decrease In Appetite  Contusion Of Lung  Open Wound Of The Scalp  Acute Sore Throat  Acute Lymphadenitis      No past medical history on file.    Social History     Tobacco Use     Smoking status: Light Smoker     Types: Cigarettes     Smokeless tobacco: Never   Substance Use Topics     Alcohol use: No     Comment: Alcoholic Drinks/day: drinks small bottle over 3 days       Review of Systems    Vitals:    12/07/22 1503   BP: 131/81   BP Location: Right arm   Patient Position: Sitting   Cuff Size: Adult Large   Pulse: 86   Resp: 18   Temp: 98.5  F (36.9  C)   TempSrc: Oral   SpO2: 99%   Weight: 71.4 kg (157 lb 4.8 oz)       Physical Exam  Vitals and nursing note reviewed.   Constitutional:       General: He is not in acute distress.     Appearance: He is not toxic-appearing or diaphoretic.   HENT:      Head: Normocephalic and atraumatic.        Right Ear: Tympanic membrane, ear canal and external ear normal.      Left Ear: Tympanic membrane, ear canal and external ear normal.      Mouth/Throat:      Mouth: Mucous membranes are moist.      Pharynx: No oropharyngeal exudate or posterior oropharyngeal erythema.   Eyes:      Conjunctiva/sclera: Conjunctivae normal.   Pulmonary:      Effort: Pulmonary effort is normal. No respiratory distress.   Neurological:      Mental Status: He is alert.   Psychiatric:         Mood and Affect: Mood normal.         Behavior: Behavior normal.         Thought Content: Thought content normal.          Judgment: Judgment normal.         Results:  Results for orders placed or performed in visit on 12/07/22   CBC with platelets     Status: Abnormal   Result Value Ref Range    WBC Count 15.5 (H) 4.0 - 11.0 10e3/uL    RBC Count 5.35 4.40 - 5.90 10e6/uL    Hemoglobin 14.8 13.3 - 17.7 g/dL    Hematocrit 45.6 40.0 - 53.0 %    MCV 85 78 - 100 fL    MCH 27.7 26.5 - 33.0 pg    MCHC 32.5 31.5 - 36.5 g/dL    RDW 13.4 10.0 - 15.0 %    Platelet Count 413 150 - 450 10e3/uL         At the end of the encounter, I discussed results, diagnosis, medications. Discussed red flags for immediate return to clinic/ER, as well as indications for follow up if no improvement. Patient understood and agreed to plan. Patient was stable for discharge.

## 2022-12-07 NOTE — PATIENT INSTRUCTIONS
1. Tempomandibular joint disorder is often caused by overuse; teeth clenching, teeth grinding, gum chewing, or pen/nail biting. I recommend follow up with a dentist for a mouth apparatus to prevent the grinding. Avoid chewy foods, stick to the liquid or soft foods.   2.  I will call you with your lab results once they are available.  If it shows signs of a salivary gland infection I will start you on an antibiotic.  3. Take Tylenol 1000 mg every 6 hours and Naproxen 500 mg every 12 hours for pain/fever.  Do not exceed 4000mg of acetaminophen from any source in a 24 hour period.  Taking Tylenol and Naproxen together may be helpful in reducing pain.  4. Icing the jaw for 20 minutes at a time 2-3 times per day may be helpful for swelling.  5. Follow up if you are not having any improvement in your symptoms over the course of the next 10-14 days.

## 2022-12-09 ENCOUNTER — TELEPHONE (OUTPATIENT)
Dept: FAMILY MEDICINE | Facility: CLINIC | Age: 22
End: 2022-12-09

## 2022-12-09 NOTE — TELEPHONE ENCOUNTER
----- Message from Nina Miller MD sent at 12/8/2022  4:16 PM CST -----  Please call patient and let him know that the amylase blood test was normal.

## 2022-12-09 NOTE — TELEPHONE ENCOUNTER
Called patient and relayed message. Patient understood, no questions.    Winifred Javier MA on 12/09/2022 at 1:28 PM

## 2023-01-23 ENCOUNTER — TELEPHONE (OUTPATIENT)
Dept: FAMILY MEDICINE | Facility: CLINIC | Age: 23
End: 2023-01-23
Payer: COMMERCIAL

## 2023-01-23 NOTE — TELEPHONE ENCOUNTER
Reason for call:  Other   Patient called regarding (reason for call): appointment  Additional comments:   Seeking a potential sooner appt for ongoing stomach trouble.  Please reach out to patient and thank you.     Phone number to reach patient:  Cell number on file:    Telephone Information:   Mobile [phone]       Best Time:  any    Can we leave a detailed message on this number?  YES    Travel screening: Not Applicable

## 2023-01-23 NOTE — TELEPHONE ENCOUNTER
Spoke to patient and offered sooner appt with Dr. Means.   Pt declined and will keep appt with Dr. Nagel on 02/09. Pt is aware of our walk in clinic locations as well if pain increases.

## 2023-02-09 ENCOUNTER — OFFICE VISIT (OUTPATIENT)
Dept: FAMILY MEDICINE | Facility: CLINIC | Age: 23
End: 2023-02-09
Payer: COMMERCIAL

## 2023-02-09 VITALS
DIASTOLIC BLOOD PRESSURE: 82 MMHG | RESPIRATION RATE: 12 BRPM | SYSTOLIC BLOOD PRESSURE: 126 MMHG | TEMPERATURE: 97.9 F | OXYGEN SATURATION: 98 % | HEART RATE: 89 BPM | BODY MASS INDEX: 27.66 KG/M2 | WEIGHT: 166 LBS | HEIGHT: 65 IN

## 2023-02-09 DIAGNOSIS — R10.11 ABDOMINAL PAIN, RIGHT UPPER QUADRANT: ICD-10-CM

## 2023-02-09 DIAGNOSIS — K29.50 OTHER CHRONIC GASTRITIS WITHOUT HEMORRHAGE: ICD-10-CM

## 2023-02-09 DIAGNOSIS — K21.00 GASTROESOPHAGEAL REFLUX DISEASE WITH ESOPHAGITIS WITHOUT HEMORRHAGE: ICD-10-CM

## 2023-02-09 DIAGNOSIS — R13.11 ORAL PHASE DYSPHAGIA: Primary | ICD-10-CM

## 2023-02-09 LAB
ALBUMIN SERPL BCG-MCNC: 4.7 G/DL (ref 3.5–5.2)
ALP SERPL-CCNC: 67 U/L (ref 40–129)
ALT SERPL W P-5'-P-CCNC: 28 U/L (ref 10–50)
ANION GAP SERPL CALCULATED.3IONS-SCNC: 12 MMOL/L (ref 7–15)
AST SERPL W P-5'-P-CCNC: 35 U/L (ref 10–50)
BILIRUB SERPL-MCNC: 0.3 MG/DL
BUN SERPL-MCNC: 9 MG/DL (ref 6–20)
CALCIUM SERPL-MCNC: 9.6 MG/DL (ref 8.6–10)
CHLORIDE SERPL-SCNC: 108 MMOL/L (ref 98–107)
CREAT SERPL-MCNC: 0.82 MG/DL (ref 0.67–1.17)
DEPRECATED HCO3 PLAS-SCNC: 23 MMOL/L (ref 22–29)
ERYTHROCYTE [DISTWIDTH] IN BLOOD BY AUTOMATED COUNT: 13.1 % (ref 10–15)
GFR SERPL CREATININE-BSD FRML MDRD: >90 ML/MIN/1.73M2
GGT SERPL-CCNC: 119 U/L (ref 8–61)
GLUCOSE SERPL-MCNC: 99 MG/DL (ref 70–99)
HCT VFR BLD AUTO: 42.7 % (ref 40–53)
HGB BLD-MCNC: 13.8 G/DL (ref 13.3–17.7)
MCH RBC QN AUTO: 27.4 PG (ref 26.5–33)
MCHC RBC AUTO-ENTMCNC: 32.3 G/DL (ref 31.5–36.5)
MCV RBC AUTO: 85 FL (ref 78–100)
PLATELET # BLD AUTO: 440 10E3/UL (ref 150–450)
POTASSIUM SERPL-SCNC: 3.7 MMOL/L (ref 3.4–5.3)
PROT SERPL-MCNC: 7.7 G/DL (ref 6.4–8.3)
RBC # BLD AUTO: 5.04 10E6/UL (ref 4.4–5.9)
SODIUM SERPL-SCNC: 143 MMOL/L (ref 136–145)
WBC # BLD AUTO: 9.1 10E3/UL (ref 4–11)

## 2023-02-09 PROCEDURE — 85027 COMPLETE CBC AUTOMATED: CPT | Performed by: FAMILY MEDICINE

## 2023-02-09 PROCEDURE — 80053 COMPREHEN METABOLIC PANEL: CPT | Performed by: FAMILY MEDICINE

## 2023-02-09 PROCEDURE — 90471 IMMUNIZATION ADMIN: CPT | Performed by: FAMILY MEDICINE

## 2023-02-09 PROCEDURE — 82977 ASSAY OF GGT: CPT | Performed by: FAMILY MEDICINE

## 2023-02-09 PROCEDURE — 99214 OFFICE O/P EST MOD 30 MIN: CPT | Mod: 25 | Performed by: FAMILY MEDICINE

## 2023-02-09 PROCEDURE — 90677 PCV20 VACCINE IM: CPT | Performed by: FAMILY MEDICINE

## 2023-02-09 PROCEDURE — 36415 COLL VENOUS BLD VENIPUNCTURE: CPT | Performed by: FAMILY MEDICINE

## 2023-02-09 ASSESSMENT — PATIENT HEALTH QUESTIONNAIRE - PHQ9
SUM OF ALL RESPONSES TO PHQ QUESTIONS 1-9: 15
SUM OF ALL RESPONSES TO PHQ QUESTIONS 1-9: 15
10. IF YOU CHECKED OFF ANY PROBLEMS, HOW DIFFICULT HAVE THESE PROBLEMS MADE IT FOR YOU TO DO YOUR WORK, TAKE CARE OF THINGS AT HOME, OR GET ALONG WITH OTHER PEOPLE: SOMEWHAT DIFFICULT

## 2023-02-09 NOTE — PROGRESS NOTES
"  Assessment & Plan     Oral phase dysphagia  Patient has new symptoms and present for 2 months consisting of need to urged to vomit after eating meals does not matter if these are solid or liquid.  He has not lost weight.  Repeating a CBC today.  - XR Esophagram w Upper GI; Future  - CBC with platelets; Future    Gastroesophageal reflux disease with esophagitis without hemorrhage    Symptoms of gastritis are still present they occur when he eats spicy food or heavy meal restarting Prilosec twice daily side effect discussed with patient  - omeprazole (PRILOSEC) 20 MG DR capsule; Take 1 capsule (20 mg) by mouth 2 times daily    Other chronic gastritis without hemorrhage    Patient denies drinking alcohol regularly however he has had 2 drinking episodes in the last 2 months both cause him to have abdominal pain and rechecking a liver panel and a GGT today.  - omeprazole (PRILOSEC) 20 MG DR capsule; Take 1 capsule (20 mg) by mouth 2 times daily  - Comprehensive metabolic panel (BMP + Alb, Alk Phos, ALT, AST, Total. Bili, TP); Future  - GGT; Future  - CBC with platelets; Future    Abdominal pain, right upper quadrant    Complains of intermittent abdominal pain that is only associated with eating.  No flank pain.  - Comprehensive metabolic panel (BMP + Alb, Alk Phos, ALT, AST, Total. Bili, TP); Future  - GGT; Future  - CBC with platelets; Future             Nicotine/Tobacco Cessation:  He reports that he has been smoking cigarettes. He has never used smokeless tobacco.  Nicotine/Tobacco Cessation Plan:         BMI:   Estimated body mass index is 28.05 kg/m  as calculated from the following:    Height as of this encounter: 1.638 m (5' 4.5\").    Weight as of this encounter: 75.3 kg (166 lb).       Depression Screening Follow Up    PHQ 2/9/2023   PHQ-9 Total Score 15   Q9: Thoughts of better off dead/self-harm past 2 weeks Not at all     Last PHQ-9 2/9/2023   1.  Little interest or pleasure in doing things 3   2.  Feeling " down, depressed, or hopeless 1   3.  Trouble falling or staying asleep, or sleeping too much 3   4.  Feeling tired or having little energy 3   5.  Poor appetite or overeating 1   6.  Feeling bad about yourself 1   7.  Trouble concentrating 2   8.  Moving slowly or restless 1   Q9: Thoughts of better off dead/self-harm past 2 weeks 0   PHQ-9 Total Score 15   Difficulty at work, home, or with people -               No follow-ups on file.    Alfa Nagel MD  [facility] SEFERINO Jones is a 22 year old, presenting for the following health issues:  Abdominal Pain (Sometimes burns sometimes just hurts, chest feels heavy or tight sometimes)    22-year-old male here for follow-up of seen him previously, he has a history of chronic gastritis which is mainly due to secondary use of alcohol.  He has not drank regularly for some time however he started drinking again and has had 2-3 episodes of drinking the last 2 months he says when he has more than 2-3 drinks his stomach feels bad and he feels like throwing up he is not drinking daily.  Over the last 2 months he states that sometimes he feels like he has to force food down after he swallows it.  He says it does not matter what spicy food or liquids he feels like he needs to forcibly vomit to relieve the pressure in his chest.  No shortness of breath noted.  He has no problems having bowel movements he is not constipated he is urinating regularly.  History of Present Illness       Reason for visit:  Stomch pain    He eats 0-1 servings of fruits and vegetables daily.He consumes 0 sweetened beverage(s) daily.     Today's PHQ-9         PHQ-9 Total Score: 15    PHQ-9 Q9 Thoughts of better off dead/self-harm past 2 weeks :   Not at all    How difficult have these problems made it for you to do your work, take care of things at home, or get along with other people: Somewhat difficult             Review of Systems   Constitutional, HEENT, cardiovascular,  "pulmonary, gi and gu systems are negative, except as otherwise noted.      Objective    /82   Pulse 89   Temp 97.9  F (36.6  C) (Oral)   Resp 12   Ht 1.638 m (5' 4.5\")   Wt 75.3 kg (166 lb)   SpO2 98%   BMI 28.05 kg/m    Body mass index is 28.05 kg/m .  Physical Exam   GENERAL: healthy, alert and no distress  EYES: Eyes grossly normal to inspection, PERRL and conjunctivae and sclerae normal  HENT: ear canals and TM's normal, nose and mouth without ulcers or lesions  NECK: no adenopathy, no asymmetry, masses, or scars and thyroid normal to palpation  RESP: lungs clear to auscultation - no rales, rhonchi or wheezes  CV: regular rate and rhythm, normal S1 S2, no S3 or S4, no murmur, click or rub, no peripheral edema and peripheral pulses strong  ABDOMEN: soft, nontender, no hepatosplenomegaly, no masses and bowel sounds normal  MS: no gross musculoskeletal defects noted, no edema  SKIN: no suspicious lesions or rashes  NEURO: Normal strength and tone, mentation intact and speech normal  PSYCH: mentation appears normal, affect normal/bright            "

## 2023-02-10 ENCOUNTER — TELEPHONE (OUTPATIENT)
Dept: FAMILY MEDICINE | Facility: CLINIC | Age: 23
End: 2023-02-10
Payer: COMMERCIAL

## 2023-02-10 NOTE — RESULT ENCOUNTER NOTE
Please inform patient that his kidney tests were normal however one of his liver tests is abnormal I would advise him not to drink at all because this can cause some of his symptoms please let him know this thank you

## 2023-02-10 NOTE — TELEPHONE ENCOUNTER
Called pt and relayed lab result. Pt verbalized understanding and questions were answered.    Jude Ku Cem Say, BSN RN  [facility]        ----- Message from Alfa Nagel MD sent at 2/10/2023  7:31 AM CST -----  Please inform patient that his kidney tests were normal however one of his liver tests is abnormal I would advise him not to drink at all because this can cause some of his symptoms please let him know this thank you

## 2023-02-20 ENCOUNTER — HOSPITAL ENCOUNTER (OUTPATIENT)
Dept: RADIOLOGY | Facility: HOSPITAL | Age: 23
Discharge: HOME | End: 2023-02-20
Attending: FAMILY MEDICINE | Admitting: FAMILY MEDICINE
Payer: COMMERCIAL

## 2023-02-20 DIAGNOSIS — R13.11 ORAL PHASE DYSPHAGIA: ICD-10-CM

## 2023-02-20 PROCEDURE — 74246 X-RAY XM UPR GI TRC 2CNTRST: CPT

## 2023-02-20 PROCEDURE — 255N000001 HC RX 255: Performed by: FAMILY MEDICINE

## 2023-02-20 RX ADMIN — ANTACID/ANTIFLATULENT 4 G: 380; 550; 10; 10 GRANULE, EFFERVESCENT ORAL at 13:22

## 2023-02-21 ENCOUNTER — TELEPHONE (OUTPATIENT)
Dept: FAMILY MEDICINE | Facility: CLINIC | Age: 23
End: 2023-02-21
Payer: COMMERCIAL

## 2023-02-21 NOTE — TELEPHONE ENCOUNTER
Called pt and relayed lab result. Pt verbalized understanding.    Jude Ku Cem Say, BSN RN  [facility]      ----- Message from Alfa Nagel MD sent at 2/21/2023  7:32 AM CST -----  Please inform patient that test results from his study are normal